# Patient Record
Sex: FEMALE | Race: WHITE
[De-identification: names, ages, dates, MRNs, and addresses within clinical notes are randomized per-mention and may not be internally consistent; named-entity substitution may affect disease eponyms.]

---

## 2017-04-14 ENCOUNTER — HOSPITAL ENCOUNTER (EMERGENCY)
Dept: HOSPITAL 68 - ERH | Age: 52
End: 2017-04-14
Payer: COMMERCIAL

## 2017-04-14 VITALS — SYSTOLIC BLOOD PRESSURE: 139 MMHG | DIASTOLIC BLOOD PRESSURE: 84 MMHG

## 2017-04-14 VITALS — BODY MASS INDEX: 25.9 KG/M2 | WEIGHT: 165 LBS | HEIGHT: 67 IN

## 2017-04-14 DIAGNOSIS — R10.13: Primary | ICD-10-CM

## 2017-04-14 LAB
ABSOLUTE GRANULOCYTE CT: 5.9 /CUMM (ref 1.4–6.5)
BASOPHILS # BLD: 0 /CUMM (ref 0–0.2)
BASOPHILS NFR BLD: 0.4 % (ref 0–2)
EOSINOPHIL # BLD: 0.2 /CUMM (ref 0–0.7)
EOSINOPHIL NFR BLD: 1.9 % (ref 0–5)
ERYTHROCYTE [DISTWIDTH] IN BLOOD BY AUTOMATED COUNT: 13.5 % (ref 11.5–14.5)
GRANULOCYTES NFR BLD: 63.6 % (ref 42.2–75.2)
HCT VFR BLD CALC: 39.1 % (ref 37–47)
LYMPHOCYTES # BLD: 2.6 /CUMM (ref 1.2–3.4)
MCH RBC QN AUTO: 30.4 PG (ref 27–31)
MCHC RBC AUTO-ENTMCNC: 33.5 G/DL (ref 33–37)
MCV RBC AUTO: 90.6 FL (ref 81–99)
MONOCYTES # BLD: 0.6 /CUMM (ref 0.1–0.6)
PLATELET # BLD: 273 /CUMM (ref 130–400)
PMV BLD AUTO: 8.5 FL (ref 7.4–10.4)
RED BLOOD CELL CT: 4.31 /CUMM (ref 4.2–5.4)
WBC # BLD AUTO: 9.3 /CUMM (ref 4.8–10.8)

## 2017-06-05 ENCOUNTER — HOSPITAL ENCOUNTER (EMERGENCY)
Dept: HOSPITAL 68 - ERH | Age: 52
End: 2017-06-05
Payer: COMMERCIAL

## 2017-06-05 VITALS — BODY MASS INDEX: 25.9 KG/M2 | HEIGHT: 67 IN | WEIGHT: 165 LBS

## 2017-06-05 VITALS — DIASTOLIC BLOOD PRESSURE: 84 MMHG | SYSTOLIC BLOOD PRESSURE: 124 MMHG

## 2017-06-05 DIAGNOSIS — M54.16: Primary | ICD-10-CM

## 2017-06-05 NOTE — ED UPPER/LOWER EXTREMITY COMPL
History of Present Illness
 
General
Chief Complaint: Lower Extremity Problems
Stated Complaint: L LEG PAIN
Source: patient
Exam Limitations: no limitations
 
Vital Signs & Intake/Output
Vital Signs & Intake/Output
 Vital Signs
 
 
Date Time Temp Pulse Resp B/P B/P Pulse O2 O2 Flow FiO2
 
     Mean Ox Delivery Rate 
 
 1343 97.2 96 16 124/84  98 Room Air  
 
 
Allergies
Coded Allergies:
Influenza Virus Vaccines (ITCHING 16)
morphine (REDNESS, ITCHING, SWELLING 16)
tetanus immune globulin (UNKNOWN 16)
tetanus toxoid, adsorbed (RASH 16)
prochlorperazine (From Compazine) (STROKE-LIKE SYMPTOMS 16)
 
Reconcile Medications
Cyclobenzaprine HCl 10 MG TABLET   1 TAB PO QPM MUSCLE SPASMS  (Reported)
Meclizine HCl 12.5 MG TABLET   1 TAB PO BID VERTIGO  (Reported)
Oxycodone HCl 15 MG TABLET   1 TAB PO Q6 PAIN  (Reported)
Pantoprazole Sodium 40 MG TABLET.DR   1 TAB PO DAILY GI  (Reported)
Simvastatin (Zocor*) 20 MG TABLET   1 TAB PO QPM CHOLESTEROL  (Reported)
 
Triage Note:
PT HERE WITH C/O OF LEFT LEG PAIN.  PT STATES SHE
 DOES HAVE SCIATICA PROBLEM.  PT STATES SHE CAN
 PINCH HER LEG AND SHE CAN'T FEEL IT.  PT WAS
 STATES SHE CAN'T "HARDLY" WALK ON HER LEG.  PT
 AMBULATED TO TRIAGE
 ROYCE
 SHE
Triage Nurses Notes Reviewed? yes
HPI:
Patient presents for evaluation of worsening left leg pain and numbness that 
began gradually yesterday.  The pain has been constant but getting worse.  The 
patient states that her left lower extremity feels "heavy", difficulty lifting 
it up off the stretcher.  The patient's pain is described as a sharp shooting 
pain that runs down the anterior aspect of the entire leg.  The other numbness 
or tingling occurs over the anterior left thigh.  She has a past medical history
of chronic back pain and sciatica and has been in pain management.
(NICOLE MORALES,AURELIA MATSON)
 
Past History
 
Travel History
Traveled to [place] past 21 day No
 
Medical History
Any Pertinent Medical History? see below for history
Neurological: NONE
EENT: allergies
Cardiovascular: hyperlipidemia
Respiratory: NONE
Gastrointestinal: GERD
Hepatic: NONE
Renal: NONE
Musculoskeletal: disk herniation, sciatica
Psychiatric: NONE
Endocrine: NONE
Blood Disorders: NONE
Cancer(s): NONE
GYN/Reproductive: NONE
History of MRSA: No
History of VRE: No
History of CDIFF: No
 
Surgical History
Surgical History: , hysterectomy, tubal ligation, "ACID REFLUX SX" LEFT
KNEE SURGERY
 
Psychosocial History
Who do you live with Spouse
Services at Home None
What is your primary language English
Tobacco Use: Never used
ETOH Use: denies use
Illicit Drug Use: denies illicit drug use
 
Family History
Family History, If Any:
FATHER
MOTHER
BROTHER
SISTER
Relation not specified for:
  FH: asthma
  FH: colon cancer
  FH: Crohn's disease
  FH: early death
  FH: pancreatic cancer
  Hypertrophic cardiomyopathy
 
Hx Contributory? No
(AURELIA KNOTT MD)
 
Review of Systems
 
Review of Systems
Constitutional:
Reports: no symptoms. 
EENTM:
Reports: no symptoms. 
Respiratory:
Reports: no symptoms. 
Cardiovascular:
Reports: no symptoms. 
Gastrointestinal/Abdominal:
Reports: no symptoms. 
Genitourinary:
Reports: no symptoms. 
Musculoskeletal:
Reports: see HPI. 
Skin:
Reports: no symptoms. 
Neurological/Psychological:
Reports: see HPI. 
Hematologic/Endocrine:
Reports: no symptoms. 
Immunological:
Reports: no symptoms. 
All Other Systems: Reviewed and Negative
(NICOLE MORALES,AURELIA MATSON)
 
Physical Exam
 
Physical Exam
General Appearance: SEE BELOW
Comments:
Gen.: Well-nourished, well-developed, no acute respiratory distress.
Head: Normocephalic, atraumatic.
Eyes: Normal inspection bilaterally
Ears: Normal inspection bilaterally
Nose: Normal inspection, nasal cannula in place
Throat/mouth : Moist mucosa 
Neck: Supple, full range of motion, no goiter
Heart: Regular rate and rhythm
Lungs: Quiet respirations
Back: Normal range of motion
Extremities: Left lower extremity: Sensation intact to light touch 
circumferentially of the left lower extremity with decrease in sensation over 
the anterior left thigh.  Deep tendon reflexes intact to the knee and Achilles. 
Diminished ability to lift leg up off the stretcher (question weakness).  No 
saddle paresthesias but patient has diminished sensation of the saddle region of
the left lower extremity.  Distal pulses are intact to the left lower extremity.
Neurologic: Cranial nerves grossly intact, speech is clear
Skin: warm and dry
Psychiatric: Calm, cooperative, no apparent delusions or hallucinations
 
Diagram
Legs Front/Back
 
  1) numbness with decreased but present sensation
(AURELIA KNOTT MD)
 
Progress
Differential Diagnosis: DEGENERATIVE DISC DISEASE, RADICULOPATHY, SCIATICA, 
CAUDA EQUINA SYNDROME
Plan of Care:
 Orders
 
 
Procedure Date/time Status
 
Heart Healthy Diet  B Active
 
 
Comments:
2017 3:38:31 PM patient signed out to Dr. Meraz at shift change over.
(NICOLE MORALES,AURELIA MATSON)
 
Departure
 
Departure
Disposition: STILL A PATIENT
Condition: Stable
Clinical Impression
Primary Impression: Lumbar radiculopathy
Referrals:
ELVIRA MORALES,GLENN AYALA (PCP/Family)
 
Departure Forms:
Customer Survey
General Discharge Information
(NICOLE MORALES,AURELIA MATSON)
 
Departure
Comments
 
 
 
17
6 PM
52-year-old female was signed out to me by Dr. Knott.  CT scan does not show any 
acute changes.  There are no acute spinal fractures.
 
 
RESULTS BELOW
 
IMPRESSION:
No acute osseous or articular abnormality involving the lumbar spine. No
acute fracture or subluxation of the lumbar spine. Multilevel degenerative
disc disease and facet arthrosis of the lumbar spine, as noted above with
mild to moderate bilateral neuroforaminal narrowing at L4-L5. No significant
spinal canal stenosis. Please note that MRI would be more sensitive in
evaluating for underlying neuroforaminal narrowing and spinal canal stenosis.
This can be performed on an outpatient basis barring any indications for
emergent imaging of the lumbar spine.
 
DICTATED BY: MARIA LUZ WANG MD 
DATE/TIME DICTATED:17
:JOSIAH 
DATE/TIME TRANSCRIBED:17
 
CONFIDENTIAL, DO NOT COPY WITHOUT APPROPRIATE AUTHORIZATION.
 
 <Electronically signed in Other Vendor System>                                 
          
                                           SIGNED BY: MARIA LUZ WANG MD [id number]
[id number]
 
(AURELIA MERAZ DO

## 2017-06-05 NOTE — CT SCAN REPORT
EXAMINATION:
CT LUMBAR SPINE WITHOUT CONTRAST
 
CLINICAL INFORMATION:
Back pain. Numbness.
 
COMPARISON:
MRI lumbar spine 03/28/2016.
 
TECHNIQUE:
Helical non-contrast CT images were obtained through the lumbar spine and
1.25 and 2.5 mm axial reconstructions were reviewed along with sagittal and
coronal MPRs.
 
DLP:
529 mGy-cm
 
FINDINGS:
There are 5 nonrib-bearing lumbar vertebral bodies. No acute fracture or
subluxation of the lumbar spine is identified. There is multilevel
degenerative spondylosis of the lumbar spine, most significant at L5-S1.
Focal lucencies within the L4 and L5 vertebral bodies corresponds to
previously identified intramural osseous hemangiomas. Evaluation of the
included soft tissues demonstrates scattered atherosclerosis of the thoracic
aorta. A 2 mm nonobstructing stone is identified within the midpole of the
left kidney.
 
Evaluation of the individual levels demonstrates:
 
T12-L1: No significant neuroforaminal narrowing or spinal canal stenosis.
 
L1-L2: No significant neuroforaminal narrowing or spinal canal stenosis.
 
L2-L3: Slight diffuse annular disc bulge. Mild bilateral facet arthrosis
without significant spinal canal stenosis or neuroforaminal narrowing.
 
L3-L4: Annular disc bulge and facet arthrosis contributing to mild
right-sided neuroforaminal narrowing. No significant left-sided
neuroforaminal narrowing or spinal canal stenosis.
 
L4-L5: Bilateral facet arthrosis and annular disc bulge contributing to mild
to moderate bilateral neuroforaminal narrowing. No significant spinal canal
stenosis.
 
L5-S1: No significant neuroforaminal narrowing or spinal canal stenosis.
 
IMPRESSION:
No acute osseous or articular abnormality involving the lumbar spine. No
acute fracture or subluxation of the lumbar spine. Multilevel degenerative
disc disease and facet arthrosis of the lumbar spine, as noted above with
mild to moderate bilateral neuroforaminal narrowing at L4-L5. No significant
spinal canal stenosis. Please note that MRI would be more sensitive in
evaluating for underlying neuroforaminal narrowing and spinal canal stenosis.
This can be performed on an outpatient basis barring any indications for
emergent imaging of the lumbar spine.

## 2017-06-28 ENCOUNTER — HOSPITAL ENCOUNTER (OUTPATIENT)
Dept: HOSPITAL 68 - ERH | Age: 52
Setting detail: OBSERVATION
LOS: 1 days | End: 2017-06-29
Attending: INTERNAL MEDICINE | Admitting: INTERNAL MEDICINE
Payer: COMMERCIAL

## 2017-06-28 VITALS — SYSTOLIC BLOOD PRESSURE: 128 MMHG | DIASTOLIC BLOOD PRESSURE: 84 MMHG

## 2017-06-28 VITALS — HEIGHT: 67 IN | WEIGHT: 165 LBS | BODY MASS INDEX: 25.9 KG/M2

## 2017-06-28 VITALS — SYSTOLIC BLOOD PRESSURE: 110 MMHG | DIASTOLIC BLOOD PRESSURE: 70 MMHG

## 2017-06-28 DIAGNOSIS — K21.9: ICD-10-CM

## 2017-06-28 DIAGNOSIS — R07.9: ICD-10-CM

## 2017-06-28 DIAGNOSIS — M54.89: ICD-10-CM

## 2017-06-28 DIAGNOSIS — K29.00: Primary | ICD-10-CM

## 2017-06-28 DIAGNOSIS — R10.9: ICD-10-CM

## 2017-06-28 LAB
ABSOLUTE GRANULOCYTE CT: 4 /CUMM (ref 1.4–6.5)
BASOPHILS # BLD: 0.1 /CUMM (ref 0–0.2)
BASOPHILS NFR BLD: 0.9 % (ref 0–2)
EOSINOPHIL # BLD: 0.1 /CUMM (ref 0–0.7)
EOSINOPHIL NFR BLD: 1.7 % (ref 0–5)
ERYTHROCYTE [DISTWIDTH] IN BLOOD BY AUTOMATED COUNT: 13.1 % (ref 11.5–14.5)
GRANULOCYTES NFR BLD: 60.4 % (ref 42.2–75.2)
HCT VFR BLD CALC: 36.7 % (ref 37–47)
LYMPHOCYTES # BLD: 2 /CUMM (ref 1.2–3.4)
MCH RBC QN AUTO: 31.2 PG (ref 27–31)
MCHC RBC AUTO-ENTMCNC: 34.1 G/DL (ref 33–37)
MCV RBC AUTO: 91.4 FL (ref 81–99)
MONOCYTES # BLD: 0.4 /CUMM (ref 0.1–0.6)
PLATELET # BLD: 237 /CUMM (ref 130–400)
PMV BLD AUTO: 8.6 FL (ref 7.4–10.4)
RED BLOOD CELL CT: 4.02 /CUMM (ref 4.2–5.4)
WBC # BLD AUTO: 6.7 /CUMM (ref 4.8–10.8)

## 2017-06-28 PROCEDURE — G0378 HOSPITAL OBSERVATION PER HR: HCPCS

## 2017-06-28 NOTE — ED GI/GU/ABDOMINAL COMPLAINT
History of Present Illness
 
General
Chief Complaint: Chest Pain
Stated Complaint: PT C/O CHEST PAIN DIFF BREATHING O2 SAT 99%
Source: patient
Exam Limitations: no limitations
Allergies
Coded Allergies:
Influenza Virus Vaccines (ITCHING 17)
morphine (REDNESS, ITCHING, SWELLING 17)
tetanus immune globulin (UNKNOWN 17)
tetanus toxoid, adsorbed (RASH 17)
prochlorperazine (From Compazine) (STROKE-LIKE SYMPTOMS 17)
 
Reconcile Medications
Cyclobenzaprine HCl 10 MG TABLET   1 TAB PO QPM MUSCLE SPASMS  (Reported)
Diclofenac Sodium 75 MG TABLET.DR   1 TAB PO BID PRN PAIN
Hyoscyamine (Levsin) 0.125 MG TABLET   1-2 TAB PO Q6P PRN ABDOMINAL CRAMPS
Meclizine HCl 12.5 MG TABLET   1 TAB PO BID VERTIGO  (Reported)
Ondansetron (Zofran Odt) 4 MG TAB.RAPDIS   1 TAB SL Q6P PRN NAUSEA/VOMITING
Oxycodone HCl 15 MG TABLET   1 TAB PO Q6 PAIN  (Reported)
Pantoprazole Sodium 40 MG TABLET.DR   1 TAB PO DAILY GI  (Reported)
Simvastatin (Zocor*) 20 MG TABLET   1 TAB PO QPM CHOLESTEROL  (Reported)
 
Triage Note:
TRIAGE: PATIENT TO ER REPORTS WOKE W/ 10/10
 BURNING SHARP PAIN TO MID CHEST AND INTO/ ACROSS
 UPPER BACK W/ SOB. REPORTS RELIEF WHEN SITTING
 "HUNCHED OVER." PATIENT REPORTS "MY STOMACH HURTS
 AND I FEEL NAUSEOUS." HX GERD.
Triage Nurses Notes Reviewed? yes
Pregnant? n
Is pt currently breastfeeding? No
HPI:
Patient presents for evaluation of an abrupt onset of sharp epigastric abdominal
pain and a burning back pain that began about 12:00 this morning.  The pains of 
been severe and constant.  Patient has also felt "gassy" but denies any 
associated fever, cold symptoms, vomiting, diarrhea or dysuria.  She likewise 
denies any prior episodes.  Nothing seems to make the pain feel better.
(NICOLE MORALES,HAY MATSON)
 
Vital Signs & Intake/Output
Vital Signs & Intake/Output
 Vital Signs
 
 
Date Time Temp Pulse Resp B/P B/P Pulse O2 O2 Flow FiO2
 
     Mean Ox Delivery Rate 
 
 1450 97.6 82 20 122/80  94 Room Air  
 
 0717 97.7 83 20 108/70  97 Room Air  
 
 2303 98.3 98 20 128/84  94 Room Air  
 
 
 ED Intake and Output
 
 
  0000  1200
 
Intake Total 260 1000
 
Output Total  
 
Balance 260 1000
 
   
 
Intake, IV 10 1000
 
Intake, Oral 250 
 
Patient 165 lb 165 lb
 
Weight  
 
Weight  Reported by Patient
 
Measurement  
 
Method  
 
 
 
Past History
 
Travel History
Traveled to [place] past 21 day No
 
Medical History
Any Pertinent Medical History? see below for history
Neurological: NONE
EENT: allergies
Cardiovascular: hyperlipidemia
Respiratory: NONE
Gastrointestinal: GERD
Hepatic: NONE
Renal: NONE
Musculoskeletal: disk herniation, sciatica
Psychiatric: NONE
Endocrine: NONE
Blood Disorders: NONE
Cancer(s): NONE
GYN/Reproductive: NONE
History of MRSA: No
History of VRE: No
History of CDIFF: No
 
Surgical History
Surgical History: , hysterectomy, tubal ligation, "ACID REFLUX SX" LEFT
KNEE SURGERY
 
Psychosocial History
Who do you live with Spouse
Services at Home None
What is your primary language English
Tobacco Use: Never used
 
Family History
Family History, If Any:
FATHER
MOTHER
BROTHER
SISTER
Relation not specified for:
  FH: asthma
  FH: colon cancer
  FH: Crohn's disease
  FH: early death
  FH: pancreatic cancer
  Hypertrophic cardiomyopathy
 
Hx Contributory? No
(NICOLE MORALES,HAY MATSON)
 
Review of Systems
 
Review of Systems
Constitutional:
Reports: no symptoms. 
EENTM:
Reports: no symptoms. 
Respiratory:
Reports: no symptoms. 
Cardiovascular:
Reports: no symptoms. 
GI:
Reports: see HPI. 
Genitourinary:
Reports: no symptoms. 
Musculoskeletal:
Reports: no symptoms. 
Skin:
Reports: no symptoms. 
Neurological/Psychological:
Reports: no symptoms. 
Hematologic/Endocrine:
Reports: no symptoms. 
Immunologic/Allergic:
Reports: no symptoms. 
All Other Systems: Reviewed and Negative
(NICOLE MORALES,HAY MATSON)
 
Physical Exam
 
Physical Exam
Gastrointestinal: SEE BELOW
Comments:
Gen.: Well-nourished, well-developed, no acute respiratory distress.
Head: Normocephalic, atraumatic.
Eyes: Normal inspection bilaterally
Ears: Normal inspection bilaterally
Nose: Normal inspection
Throat/mouth : Moist mucosa 
Neck: Supple, full range of motion, no goiter
Heart: Regular rate and rhythm, no murmurs rubs or gallops
Lungs: Clear to auscultation bilaterally with normal air entry
Chest: Nontender
Back: Normal range of motion
Abdomen: Soft, left mid quadrant and epigastric abdominal pain without rebound 
or guarding, no pulsatile masses, nondistended, normal bowel sounds
Extremities: Normal range of motion grossly, equal radial pulses, no cyanosis 
clubbing or edema, equal extremity pulses.
Neurologic: Cranial nerves grossly intact, speech is clear
Skin: warm and dry
Psychiatric: Calm, cooperative, no apparent delusions or hallucinations
 
 
Core Measures
ACS in differential dx? No
Severe Sepsis Present: No
Septic Shock Present: No
(NICOLE MORALES,HAY MATSON)
 
Progress
Differential Diagnosis: PANCREATITIS, PEPTIC ULCER DISEASE, ACID REFLUX, AORTIC 
DISEASE
Diagnostic Imaging:
Discussed w/RAD: CT Scan. 
Radiology Impression: PATIENT: DOMONIQUE SCOTT  MEDICAL RECORD NO: [id number] 
PRESENT AGE: 52  PATIENT ACCOUNT NO: [id number] : 66  LOCATION: [facility] 
ORDERING PHYSICIAN: HAY KNOTT MD     SERVICE DATE:  EXAM TYPE:
CAT - CT ABD ANGIOGRAM; CT PELVIS ANGIOGRAM; CTA CHEST EXAMINATION: CT ANGIOGRAM
THE CHEST ABDOMEN AND PELVIS. CLINICAL INFORMATION: Evaluate for aortic 
dissection. Chest abdominal and back pain. COMPARISON: CT scan of the abdomen 
and pelvis 2016. TECHNIQUE:  images were obtained. A noncontrast CT 
scan of the chest was obtained. A CT angiogram of the chest abdomen and pelvis 
was then performed after the intravenous administration of 115 mL Optiray 350. 
No adverse contrast reaction was reported. Data was reformatted into multiplanar
images at the acquisition workstation. DLP: 1261.54 mGy-cm. FINDINGS: The 
noncontrast images through the chest reveal no evidence of mural hemorrhage to 
suggest acute arterial dissection. The diameter of the thoracic aorta is normal 
and there is no evidence of acute vascular dissection. The origins of major 
aortic branches are widely patent. The heart is normal and there is no 
pericardial effusion. There is adequate opacification of the pulmonary arterial 
vasculature and there is no evidence to suggest acute pulmonary embolism. Lungs 
are clear and well expanded. There is no focal consolidative disease, pleural 
effusion, or pneumothorax. The trachea and major airways are widely patent. 
Visualized portions of the thoracic outlet are normal. The chest wall is intact 
and there is no evidence of acute rib fracture. There is no acute osseous 
finding. No worrisome lytic or blastic osseous lesions within the field-of-view 
of this examination. Vertebral heights are preserved and there is no evidence of
acute thoracic vertebral compression fracture. Grossly no evidence of bony canal
or neuroforaminal compromise within the thoracic spine. A few scattered foci of 
atheromatous calcification is visualized within the infrarenal abdominal aorta. 
The renal arteries are widely patent and the major unpaired aortic branches 
including the celiac trunk, superior mesenteric artery, and inferior mesenteric 
artery are patent. The iliac arteries as well as the internal and external iliac
artery branches are patent. Visualized common femoral arteries as well as their 
superficial and deep femoral artery branches are patent. Liver attenuation is 
homogeneous with no evidence of a discrete hepatic parenchymal mass. Gallbladder
is normal. Grossly there is no evidence of extrahepatic biliary ductal 
dilatation. Spleen and pancreas are normal. Kidneys demonstrate symmetric 
enhancement with no evidence of a discrete mass. There is no hydronephrosis. No 
abnormal perinephric inflammation or collection. Adrenal glands are normal. The 
stomach is physiologically distended with ingested material. Small bowel is 
normal. A few diverticula are visualized within the distal descending and 
sigmoid colon. No evidence of acute diverticulitis. Appendix is normal. No 
abnormal perirectal or presacral inflammation. There are chronic changes of an 
abdominal hysterectomy. There is no worrisome adnexal mass. No worrisome lytic 
or blastic osseous lesions. There is intervertebral disc space narrowing with 
associated sclerotic degenerative endplate changes and disc osteophyte spurring 
at L5-S1. Grossly no evidence of bony canal compromise. No evidence of acute 
fracture. IMPRESSION: No evidence of acute vascular dissection. There is no 
abnormal finding to provide an explanation for the patient's chest, abdominal, 
and back pain. DICTATED BY: TYSON NEAL MD  DATE/TIME DICTATED:17 :RAD.TRUJILLO  DATE/TIME TRANSCRIBED:17 
CONFIDENTIAL, DO NOT COPY WITHOUT APPROPRIATE AUTHORIZATION.  <Electronically 
signed in Other Vendor System>                                                  
                                     SIGNED BY: TYSON NEAL MD 17 
0531
Initial ED EKG: NSR, rate (97)
Prior EKG: unchanged
Comments:
2017 7:01:02 AM I have updated Domonique for the second time on her test 
results, initially after the blood work was resulted and shortly ago with the 
CAT scan reports.  I ordered a second round of medications that is yet to be 
administered.  Patient signed out to Dr. Edgar.
(NICOLE MORALES,HAY MATSON)
Comments:
Continues to have discomfort.  The initial midsternal pain has migrated to the 
epigastric area with gas and increase stooling.
 
Unable to remain comfortable.  Unable to eat.
(HERVE MORALES,MICA)
Plan of Care:
 Orders
 
 
Procedure Date/time Status
 
Regular Diet  L Active
 
Full Liquid Diet  B Complete
 
Discharge Patient   UNK Active
 
SOCIAL WORK CONSULT   UNK Active
 
Vital Signs 1925 Complete
 
Teach/Educate 1925 Active
 
Pain Treatment and Response 1925 Active
 
Nutritional Intake, Monitor 1925 Active
 
Isolation 1925 Active
 
Intake & Output 1925 Active
 
Patient Care Conference 1925 Active
 
Activity/Ambulation 1925 Active
 
 
 Laboratory Tests
 
 
 
17 0648:
Anion Gap 9, Estimated GFR > 60, BUN/Creatinine Ratio 10.0, CBC w Diff NO MAN 
DIFF REQ, RBC 3.61  L, MCV 92.1, MCH 31.5  H, RDW 13.1, MPV 8.9, Gran % 54.5, 
Lymphocytes % 37.7, Monocytes % 5.8, Eosinophils % 1.6, Basophils % 0.4, 
Absolute Granulocytes 2.9, Absolute Lymphocytes 2.0, Absolute Monocytes 0.3, 
Absolute Eosinophils 0.1, Absolute Basophils 0, PUBS MCHC 34.2
 
17 2203:
Troponin I < 0.01
 Microbiology
622  STOOL: Stool Culture - RECD
 
I WAS NOT INVOLVED IN THE PATIENT'S CARE.
(SUNDAY MORALES,PEDRITO)
 
Departure
 
Departure
Condition: Stable
Clinical Impression
Primary Impression: Nonspecific abdominal pain
Referrals:
ELVIRA MORALES,GLENN AYALA (PCP/Family)
 
Additional Instructions:
Levsin and diclofenac as needed for abdominal cramps.  Zofran as needed for 
nausea or vomiting.  Clear liquid diet and advance as tolerated.  Follow-up with
your primary care physician if not improving over the next 48-72 hours.  Return 
if any concerns or sudden worsening.
 
Please note that there might be incidental findings in your evaluation that are 
unrelated to the current emergency department visit.  Please notify your primary
care doctor about this emergency department visit in order to obtain and review 
all of the testing performed so that these incidental findings can be monitored 
as needed.
 
If you had an x-ray performed, please understand that some fractures may not be 
seen on the initial set of x-rays.  If your symptoms persist you might need a 
repeat set of x-rays to check for such a fracture.
 
If you had a laceration evaluated, please understand that foreign bodies such as
glass or wood may not be visible to the naked eye or on plain x-rays.  If the 
wound becomes red, swollen, increasingly more painful or if there is any 
drainage from the wound, please have it reevaluated by a physician for the 
possibility of a retained foreign body.
 
Thank you for choosing the [facility] Emergency Department for your care.
It was a pleasure to serve you today.
 
Hay Knott M.D.
[facility]
 
Departure Forms:
Customer Survey
General Discharge Information
Prescriptions:
Current Visit Scripts
Hyoscyamine (Levsin) 1-2 TAB PO Q6P PRN ABDOMINAL CRAMPS 
     #20 TAB 
 
Diclofenac Sodium 1 TAB PO BID PRN PAIN 
     #14 TAB 
 
Ondansetron (Zofran Odt) 1 TAB SL Q6P PRN NAUSEA/VOMITING 
     #10 TAB 
 
 
(NICOLE MORALES,HAY MATSON)
 
Departure
Disposition: STILL A PATIENT
 
Observation Note
Spoke With:
CHLOE MORALES,[facility]
Physician Advisor Notified: MARCI MORALES,JUAN MARSHALL
Place Patient In: Non-ED OBS Care Area
Rationale for Observation:
My rational for observation is as follows intractable abdominal and chest pain.
 
(HERVE MORALES,MICA)
 
Observation Note
Rationale for Observation:
My rational for observation is as follows .
 
(SUNDAY MORALES,PEDRITO)
 
 
Hyoscyamine 0.125 MG Q4 HRS AS NEEDED PRN  1645 AC 
 
(Levsin)     
 
Oxycodone HCl 15 MG Q6P PRN  1645 AC 
 
(Roxicodone)     0856
 
Acetaminophen 650 MG Q6P PRN  1600 AC 
 
(Tylenol)     0109
 
Hydromorphone HCl 0.2 MG Q4P PRN  1600 AC 
 
(Dilaudid)     
 
Ondansetron HCl 4 MG Q6P PRN  1600 AC 
 
(Zofran)     0025
 
Sodium Chloride 1,000 ML Q13H  1600 AC 
 
(Normal Saline 0.9%)    1759  0343
 
 
 Laboratory Tests
 
 
 
17 0648:
Anion Gap 9, Estimated GFR > 60, BUN/Creatinine Ratio 10.0, CBC w Diff NO MAN 
DIFF REQ, RBC 3.61  L, MCV 92.1, MCH 31.5  H, RDW 13.1, MPV 8.9, Gran % 54.5, 
Lymphocytes % 37.7, Monocytes % 5.8, Eosinophils % 1.6, Basophils % 0.4, 
Absolute Granulocytes 2.9, Absolute Lymphocytes 2.0, Absolute Monocytes 0.3, 
Absolute Eosinophils 0.1, Absolute Basophils 0, PUBS MCHC 34.2
 
17 2203:
Troponin I < 0.01
 
17 1705:
Troponin I < 0.01
 
17 1705:
Lactic Acid 1.3
 Microbiology
 0622  STOOL: Stool Culture - RECD
 
I WAS NOT INVOLVED IN THE PATIENT'S CARE.
(SUNDAY MORALES,PEDRITO)
Comments:
Continues to have discomfort.  The initial midsternal pain has migrated to the 
epigastric area with gas and increase stooling.
 
Unable to remain comfortable.  Unable to eat.
(HERVE MORALES,MICA)
 
Departure
 
Departure
Condition: Stable
Clinical Impression
Primary Impression: Nonspecific abdominal pain
Referrals:
ELVIRA MORALES,GLENN AYALA (PCP/Family)
 
Additional Instructions:
Levsin and diclofenac as needed for abdominal cramps.  Zofran as needed for 
nausea or vomiting.  Clear liquid diet and advance as tolerated.  Follow-up with
your primary care physician if not improving over the next 48-72 hours.  Return 
if any concerns or sudden worsening.
 
Please note that there might be incidental findings in your evaluation that are 
unrelated to the current emergency department visit.  Please notify your primary
care doctor about this emergency department visit in order to obtain and review 
all of the testing performed so that these incidental findings can be monitored 
as needed.
 
If you had an x-ray performed, please understand that some fractures may not be 
seen on the initial set of x-rays.  If your symptoms persist you might need a 
repeat set of x-rays to check for such a fracture.
 
If you had a laceration evaluated, please understand that foreign bodies such as
glass or wood may not be visible to the naked eye or on plain x-rays.  If the 
wound becomes red, swollen, increasingly more painful or if there is any 
drainage from the wound, please have it reevaluated by a physician for the 
possibility of a retained foreign body.
 
Thank you for choosing the [facility] Emergency Department for your care.
It was a pleasure to serve you today.
 
Hay Knott M.D.
[facility]
 
Departure Forms:
Customer Survey
General Discharge Information
Prescriptions:
Current Visit Scripts
Hyoscyamine (Levsin) 1-2 TAB PO Q6P PRN ABDOMINAL CRAMPS 
     #20 TAB 
 
Diclofenac Sodium 1 TAB PO BID PRN PAIN 
     #14 TAB 
 
Ondansetron (Zofran Odt) 1 TAB SL Q6P PRN NAUSEA/VOMITING 
     #10 TAB 
 
 
(NICOLE MORALES,HAY MATSON)
 
Departure
Disposition: STILL A PATIENT
 
Observation Note
Spoke With:
CHLOE MORALES,[facility]
Physician Advisor Notified: MARCI MORALES,JUAN MARSHALL
Place Patient In: Non-ED OBS Care Area
Rationale for Observation:
My rational for observation is as follows intractable abdominal and chest pain.
 
(MICA EDGAR MD)
 
Observation Note
Rationale for Observation:
My rational for observation is as follows .
 
(PEDRITO BRAND MD)
 
Rationale for Observation:
My rational for observation is as follows intractable abdominal and chest pain.
 
(MICA EDGAR MD)
 
Observation Note
Rationale for Observation:
My rational for observation is as follows .
 
(PEDRITO BRAND MD)

## 2017-06-28 NOTE — PN- ATT ADDEND
Attending Addendum
Attending Brief Note
Patient seen and examined in the emergency room.  Case discussed with ED 
provider Dr. Sommers.  Plan of care discussed with the medical team and the 
patient.  Available lab work and radiology test reports were reviewed.  
 
This is 52-year-old female with past history of GERD symptoms status post Nissen
fundoplication by laparoscopic surgery about 2 years ago by Dr. Baumann.  She 
routinely takes Prilosec twice a day.  Her upper endoscopy was in November 2016 
which did not show any ulcers or esophagitis.  Her colonoscopy in March 2016 
only showed mild diverticulosis. 
 
Patient presents emergency him today for 1 day history of nausea vomiting and 
diarrhea.  The epigastric and lower retrosternal chest pain.  Pain started in 
the lower retrosternal area and then gravitated towards epigastric area.  
Patient has not been able to eat or drink.  She ate chicken with her family last
night.  Rest of family members are fine.  Patient denies any fever or chills.  
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 
gastroenteritis.  
 
 Vital Signs
 
 
Date Time Temp Pulse Resp B/P B/P Pulse O2 O2 Flow FiO2
 
     Mean Ox Delivery Rate 
 
06/28 1444 98.2 78 18 120/63  99 Room Air  
 
06/28 1141 98.4 74 20 109/64  99 Room Air  
 
06/28 0903 98.4 105 18 138/83  99 Room Air  
 
06/28 0651 96.7 94 18 131/79  99 Room Air  
 
06/28 0449 96.4        
 
06/28 0449 96.4 95 18 141/83  99 Room Air  
 
06/28 0251      97 Room Air  
 
06/28 0222 96.3 104 18 144/84  99 Room Air  
 
 
 Intake & Output
 
 
 06/28 1600 06/28 0800 06/28 0000
 
Intake Total  1000 
 
Output Total   
 
Balance  1000 
 
    
 
Intake, IV  1000 
 
Patient  165 lb 
 
Weight   
 
Weight  Reported by Patient 
 
Measurement   
 
Method   
 
 
Exam: 
General: Patient awake alert oriented without any distress 
CVS: S1 plus S2 without any murmur or gallops 
Chest: Few scattered crepitation without any wheeze.  There is no respiratory 
distress. 
Abdomen: Soft and nondistended with the mild discomfort in epigastric area.   
bowel sound present, no guarding or rebound 
CNS: Awake alert oriented without any focal neuro deficit and follows command  
appropriately 
Extremities: No edema; no clubbing or cyanosis noted.  
 
 Laboratory Tests
 
 
 06/28 06/28
 
 0646 0305
 
Chemistry  
 
  Sodium (137 - 145 mmol/L)  140
 
  Potassium (3.5 - 5.1 mmol/L)  3.8
 
  Chloride (98 - 107 mmol/L)  106
 
  Carbon Dioxide (22 - 30 mmol/L)  24
 
  Anion Gap (5 - 16)  11
 
  BUN (7 - 17 mg/dL)  7
 
  Creatinine (0.5 - 1.0 mg/dL)  0.7
 
  Estimated GFR (>60 ml/min)  > 60
 
  BUN/Creatinine Ratio (7 - 25 %)  10.0
 
  Glucose (65 - 99 mg/dL)  118  H
 
  Calcium (8.4 - 10.2 mg/dL)  9.3
 
  Total Bilirubin (0.2 - 1.3 mg/dL)  0.7
 
  AST (14 - 36 U/L)  33
 
  ALT (9 - 52 U/L)  48
 
  Alkaline Phosphatase (<127 U/L)  76
 
  Troponin I (< 0.11 ng/ml)  < 0.01
 
  Total Protein (6.3 - 8.2 g/dL)  6.7
 
  Albumin (3.5 - 5.0 g/dL)  4.0
 
  Globulin (1.9 - 4.2 gm/dL)  2.7
 
  Albumin/Globulin Ratio (1.1 - 2.2 %)  1.5
 
  Lipase (23 - 300 U/L)  147
 
Hematology  
 
  CBC w Diff  NO MAN DIFF REQ
 
  WBC (4.8 - 10.8 /CUMM)  6.7
 
  RBC (4.20 - 5.40 /CUMM)  4.02  L
 
  Hgb (12.0 - 16.0 G/DL)  12.5
 
  Hct (37 - 47 %)  36.7  L
 
  MCV (81.0 - 99.0 FL)  91.4
 
  MCH (27.0 - 31.0 PG)  31.2  H
 
  RDW (11.5 - 14.5 %)  13.1
 
  Plt Count (130 - 400 /CUMM)  237
 
  MPV (7.4 - 10.4 FL)  8.6
 
  Gran % (42.2 - 75.2 %)  60.4
 
  Lymphocytes % (20.5 - 51.1 %)  30.4
 
  Monocytes % (1.7 - 9.3 %)  6.6
 
  Eosinophils % (0 - 5 %)  1.7
 
  Basophils % (0.0 - 2.0 %)  0.9
 
  Absolute Granulocytes (1.4 - 6.5 /CUMM)  4.0
 
  Absolute Lymphocytes (1.2 - 3.4 /CUMM)  2.0
 
  Absolute Monocytes (0.10 - 0.60 /CUMM)  0.4
 
  Absolute Eosinophils (0.0 - 0.7 /CUMM)  0.1
 
  Absolute Basophils (0.0 - 0.2 /CUMM)  0.1
 
  PUBS MCHC (33.0 - 37.0 G/DL)  34.1
 
Urines  
 
  Urine Color (YEL,AMB,STR) YEL 
 
  Urine Clarity (CLEAR) CLEAR 
 
  Urine pH (5.0 - 8.0) 6.0 
 
  Ur Specific Gravity (1.001 - 1.035) <= 1.005 
 
  Urine Protein (NEG,<30 MG/DL) NEG 
 
  Urine Ketones (NEG) NEG 
 
  Urine Nitrite (NEG) NEG 
 
  Urine Bilirubin (NEG) NEG 
 
  Urine Urobilinogen (0.1  -  1.0 EU/dl) 0.2 
 
  Ur Leukocyte Esterase (NEG) NEG 
 
  Ur Microscopic SEDIMENT EXAMINED 
 
  Urine RBC (0 - 5 /HPF) 1-3 
 
  Urine WBC (0 - 2 /HPF) 1-3  H 
 
  Ur Epithelial Cells (NONE,FEW) FEW 
 
  Urine Bacteria (NEG/NONE) FEW  H 
 
  Urine Hemoglobin (NEG) TRACE-LYSED 
 
  Urine Glucose (N MG/DL) NEG 
 
 
CTA abdomen/ chest/pelvic CT 
No evidence of acute vascular dissection. There is no abnormal finding to
provide an explanation for the patient's chest, abdominal, and back pain.
 
EKG is in normal sinus rhythm without any acute ischemic changes.
 
Assessment
* Acute gastritis probably viral
* History of chronic back pain, status post epidural injections the past
* History of GERD
* History of Nissen fundoplication
* Chest pain rule out MI- patient's father had heart disease and mother had 
cardiomyopathy
Plan
* Observe on telemetry for 24 hours
* Start IV fluids if patient unable to take oral liquids
* Start IV Protonix 40 mg twice a day
* Switch Percocet to Dilaudid when necessary
* Monitor input output
* Recheck chemistry labs tomorrow
* Troponin 3 sets; EKG

## 2017-06-28 NOTE — NUR
PATIENT AMBULATORY TO AND FROM BATHROOM W/ STABLE GAIT NOTED, DENIES DIZZINESS.
URINE SPECIMEN (TRIO) OBTAINED AND SENT TO LAB. MD RIVERA AT BEDSIDE FOR
DISCUSSION OF CT RESULTS/ POC. NS BOLUS COMPLETE AT THIS TIME.

## 2017-06-28 NOTE — NUR
PATIENT NOTED TO LOWER SELF TO FLOOR WITNESSED BY THIS RN AND JADIEL SINGLETON. PATIENT
STATES "I FEEL DIZZY AND MY PAIN IS STILL 10/10." NO FALL OCCURED AND NO INJURY
OCCURED. PATIENT ASSISTED ONTO STRETCHER BY JADIEL SINGLETON. PATIENT REPLACED ON
CARDIAC MONITOR, HR WNL, NSR. VSS. SIDE RAILS REMAIN UPRIGHT, CALL RIVERA IN
REACH. MD RIVERA AWARE AND AT BEDSIDE.

## 2017-06-28 NOTE — NUR
PT AWAKE, ALERT, ORIENTED. ON MONITOR IN SINUS TACH. CONTINUES TO HAVE GI
DISTRESS STATES THE ONLY MEDICATION THAT MADE HER FEEL BETTER IN THE ED WAS THE
DILAUDID. IVF INFUSING AT 150CC/HR

## 2017-06-28 NOTE — CT SCAN REPORT
EXAMINATION:
CT ANGIOGRAM THE CHEST ABDOMEN AND PELVIS.
 
CLINICAL INFORMATION:
Evaluate for aortic dissection. Chest abdominal and back pain.
 
COMPARISON:
CT scan of the abdomen and pelvis 08/23/2016.
 
TECHNIQUE:
 images were obtained. A noncontrast CT scan of the chest was obtained.
A CT angiogram of the chest abdomen and pelvis was then performed after the
intravenous administration of 115 mL Optiray 350. No adverse contrast
reaction was reported. Data was reformatted into multiplanar images at the
acquisition workstation.
 
DLP: 1261.54 mGy-cm.
 
FINDINGS:
The noncontrast images through the chest reveal no evidence of mural
hemorrhage to suggest acute arterial dissection. The diameter of the thoracic
aorta is normal and there is no evidence of acute vascular dissection. The
origins of major aortic branches are widely patent. The heart is normal and
there is no pericardial effusion. There is adequate opacification of the
pulmonary arterial vasculature and there is no evidence to suggest acute
pulmonary embolism.
 
Lungs are clear and well expanded. There is no focal consolidative disease,
pleural effusion, or pneumothorax. The trachea and major airways are widely
patent. Visualized portions of the thoracic outlet are normal. The chest wall
is intact and there is no evidence of acute rib fracture. There is no acute
osseous finding. No worrisome lytic or blastic osseous lesions within the
field-of-view of this examination. Vertebral heights are preserved and there
is no evidence of acute thoracic vertebral compression fracture. Grossly no
evidence of bony canal or neuroforaminal compromise within the thoracic
spine.
 
A few scattered foci of atheromatous calcification is visualized within the
infrarenal abdominal aorta. The renal arteries are widely patent and the
major unpaired aortic branches including the celiac trunk, superior
mesenteric artery, and inferior mesenteric artery are patent. The iliac
arteries as well as the internal and external iliac artery branches are
patent. Visualized common femoral arteries as well as their superficial and
deep femoral artery branches are patent.
 
Liver attenuation is homogeneous with no evidence of a discrete hepatic
parenchymal mass. Gallbladder is normal. Grossly there is no evidence of
extrahepatic biliary ductal dilatation. Spleen and pancreas are normal.
 
Kidneys demonstrate symmetric enhancement with no evidence of a discrete
mass. There is no hydronephrosis. No abnormal perinephric inflammation or
collection. Adrenal glands are normal.
 
The stomach is physiologically distended with ingested material. Small bowel
is normal. A few diverticula are visualized within the distal descending and
sigmoid colon. No evidence of acute diverticulitis. Appendix is normal. No
abnormal perirectal or presacral inflammation.
 
There are chronic changes of an abdominal hysterectomy. There is no worrisome
adnexal mass.
 
No worrisome lytic or blastic osseous lesions. There is intervertebral disc
space narrowing with associated sclerotic degenerative endplate changes and
disc osteophyte spurring at L5-S1. Grossly no evidence of bony canal
compromise. No evidence of acute fracture.
 
IMPRESSION:
No evidence of acute vascular dissection. There is no abnormal finding to
provide an explanation for the patient's chest, abdominal, and back pain.

## 2017-06-28 NOTE — NUR
BLOODS DRAWN RAC AND SENT TO LAB
IV ACCESS ESTABLISHED LAC 20G FOR ANGIOGRAMS.  PT MEDICATED WITH 4 MG ZOFRAN
IV, 20 MG PEPCID IV AND OFIMEV 1000 MG IV INFUSING AS ORDERED

## 2017-06-28 NOTE — HISTORY & PHYSICAL
ASHIA MALDONADO MD 17 1529:
General Information and HPI
History of Present Illness:
52 year old woman with past medical history of GERD s/p Nissen fundiplication, 
low back pain 2/2 disc herniation on chronic opiates, HLD, and uterine fibroids 
s/p hysterectoy seen for evaluation of acute onset chest pain.
 
Patient reports acute onset 10/10 sharp epigastric chest pain waking her from 
sleep radiating around the left chest wall to her back with associated 
palpitations. She tried taking mylanta but it did not offer her any relief, no 
seemed to make it worse. She waited approximately two hours before driving 
herself to the [facility] ED for further evaluation.
 
Upon arrival she describes developedment of acute onset abdominal pain with 
associated nausea and frequent loose, watery non-bloody stools. She denies any 
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 
diarrhea.
 
Otherwise she denies any blurred/double vision, lightheadedness/dizziness, 
headache, fever, chills, shortness of breath, vomiting, urinary discomfort/
urgency/frequency/incontinence, or constipation.
 
 
 
Allergies/Medications
Allergies:
Coded Allergies:
Influenza Virus Vaccines (ITCHING 17)
morphine (REDNESS, ITCHING, SWELLING 17)
tetanus immune globulin (UNKNOWN 17)
tetanus toxoid, adsorbed (RASH 17)
prochlorperazine (From Compazine) (STROKE-LIKE SYMPTOMS 17)
 
Home Med list
Cyclobenzaprine HCl 10 MG TABLET   1 TAB PO QPM MUSCLE SPASMS  (Reported)
Diclofenac Sodium 75 MG TABLET.DR   1 TAB PO BID PRN PAIN
Hyoscyamine (Levsin) 0.125 MG TABLET   1-2 TAB PO Q6P PRN ABDOMINAL CRAMPS
Meclizine HCl 12.5 MG TABLET   1 TAB PO BID VERTIGO  (Reported)
Ondansetron (Zofran Odt) 4 MG TAB.RAPDIS   1 TAB SL Q6P PRN NAUSEA/VOMITING
Oxycodone HCl 15 MG TABLET   1 TAB PO Q6 PAIN  (Reported)
Pantoprazole Sodium 40 MG TABLET.DR   1 TAB PO DAILY GI  (Reported)
Simvastatin (Zocor*) 20 MG TABLET   1 TAB PO QPM CHOLESTEROL  (Reported)
 
 
Past History
 
Travel History
Traveled to [place] past 21 day No
 
Medical History
Neurological: NONE
EENT: allergies
Cardiovascular: hyperlipidemia
Respiratory: NONE
Gastrointestinal: GERD
Hepatic: NONE
Renal: NONE
Musculoskeletal: disk herniation, sciatica
Psychiatric: NONE
Endocrine: NONE
Blood Disorders: NONE
Cancer(s): NONE
GYN/Reproductive: NONE
History of MRSA: No
History of VRE: No
History of CDIFF: No
 
Surgical History
Surgical History: , hysterectomy, tubal ligation, "ACID REFLUX SX" LEFT
KNEE SURGERY, Nissen fundiplication
 
Past Family/Social History
 
Family History
Relations & Conditions if any
FATHER
MOTHER
BROTHER
SISTER
Relation not specified for:
  FH: asthma
  FH: colon cancer
  FH: Crohn's disease
  FH: early death
  FH: pancreatic cancer
  Hypertrophic cardiomyopathy
 
 
Psychosocial History
Where do you live? Home
Who Do You Live With? spouse
Services at Home: None
Primary Language: English
Smoking Status: Never Smoked
ETOH Use: occasional use
 
Functional Ability
ADLs
Independent: dressing, eating, toileting, bathing. 
Ambulation: independent
IADLs
Independent: shopping, housework, finances, food prep, telephone, transportation
, medication admin. 
 
Review of Systems
 
Review of Systems
Constitutional:
Reports: see HPI. 
 
Exam & Diagnostic Data
Last 24 Hrs of Vital Signs/I&O
 Vital Signs
 
 
Date Time Temp Pulse Resp B/P B/P Pulse O2 O2 Flow FiO2
 
     Mean Ox Delivery Rate 
 
 1444 98.2 78 18 120/63  99 Room Air  
 
 1141 98.4 74 20 109/64  99 Room Air  
 
 0903 98.4 105 18 138/83  99 Room Air  
 
 0651 96.7 94 18 131/79  99 Room Air  
 
 0449 96.4        
 
 0449 96.4 95 18 141/83  99 Room Air  
 
 0251      97 Room Air  
 
 0222 96.3 104 18 144/84  99 Room Air  
 
 
 Intake & Output
 
 
  1600  0800  0000
 
Intake Total  1000 
 
Output Total   
 
Balance  1000 
 
    
 
Intake, IV  1000 
 
Patient  74.843 kg 
 
Weight   
 
Weight  Reported by Patient 
 
Measurement   
 
Method   
 
 
 
 
Physical Exam
General Appearance Alert, Oriented X3, Cooperative, No Acute Distress
Skin No Rashes, No Breakdown, No Significant Lesion
HEENT Atraumatic, EOMI, Mucous Membr. moist/pink
Neck Supple, No JVD, +2 Carotid Pulse wo Bruit
Cardiovascular Regular Rate, Normal S1, Normal S2, No Murmurs
Lungs Clear to Auscultation, Normal Air Movement
Abdomen Normal Bowel Sounds, Soft, No Masses, Moderate/Severe Epigastric 
tenderness without guarding/rigidity, Nondistended
Neurological Normal Speech, Normal Tone, Cranial Nerves 3-12 NL
Vascular Normal Pulses, Pulses Symmetrical
Last 24 Hrs of Labs/Candido:
 Laboratory Tests
 
17 0646:
Urine Color YEL, Urine Clarity CLEAR, Urine pH 6.0, Ur Specific Gravity <= 1.005
, Urine Protein NEG, Urine Ketones NEG, Urine Nitrite NEG, Urine Bilirubin NEG, 
Urine Urobilinogen 0.2, Ur Leukocyte Esterase NEG, Ur Microscopic SEDIMENT 
EXAMINED, Urine RBC 1-3, Urine WBC 1-3  H, Ur Epithelial Cells FEW, Urine 
Bacteria FEW  H, Urine Hemoglobin TRACE-LYSED, Urine Glucose NEG
 
17 0305:
Anion Gap 11, Estimated GFR > 60, BUN/Creatinine Ratio 10.0, Glucose 118  H, 
Calcium 9.3, Total Bilirubin 0.7, AST 33, ALT 48, Alkaline Phosphatase 76, 
Troponin I < 0.01, Total Protein 6.7, Albumin 4.0, Globulin 2.7, Albumin/
Globulin Ratio 1.5, Lipase 147, CBC w Diff NO MAN DIFF REQ, RBC 4.02  L, MCV 
91.4, MCH 31.2  H, RDW 13.1, MPV 8.6, Gran % 60.4, Lymphocytes % 30.4, Monocytes
% 6.6, Eosinophils % 1.7, Basophils % 0.9, Absolute Granulocytes 4.0, Absolute 
Lymphocytes 2.0, Absolute Monocytes 0.4, Absolute Eosinophils 0.1, Absolute 
Basophils 0.1, PUBS MCHC 34.1
 Microbiology
 1547  STOOL: Stool Culture - COLB
 
 
 
Diagnostic Data
EKG Results
NSR
HR 97
No ST-segment changes
Other Results
SERVICE DATE: 
EXAM TYPE: CAT - CT ABD ANGIOGRAM; CT PELVIS ANGIOGRAM; CTA CHEST
IMPRESSION:
No evidence of acute vascular dissection. There is no abnormal finding to
provide an explanation for the patient's chest, abdominal, and back pain.
 
Assessment/Plan
Assessment:
52 year old woman with multiple medical problems significant for GERD s/p Nissen
fundoplication, uterine fibroids s/p hysterecomy, and frequent ED visits/
hospitalizations seen for evaluation for acute onset chest pain. Vital signs 
upon initial evaluation were within normal limits. Physical examination is 
remarkable only for moderate/severe epigastric tenderness. CBC/BEP/LFT/Lipase/UA
/EKG within normal limits. Troponin negative. CTA Chest/Abdomen/Pelvis negative 
for acute pathology. Patient received multiple antiemetics and intravenous 
fluids in the ED with moderate improvement of her symptoms. Clinically it 
appears patients symptoms are multifactorial and are a result of her GERD with 
noncompliance of PPI and probable superimposed viral gastroenteritis; however 
given her history of Present illness acute coronary syndrome must be ruled out. 
Patient placed under observation on the telemetry floor for further evaluation:
 
Problem List:
-Acute onset Chest/Abdomen pain, probable GERD/Gastroenteritis - r/o ACS
-History of GERD s/p Nissen Fundoplication
-History of Fibroids s/p hysterectomy
-Low back pain on opiates
-Sciatica
-Hyperlipidemia
 
Plan:
-Telemetry Obs
-Troponin/EKG x3
-NS @ 75mL/hr
-Protonix 40mg IV Daily
-Zofran 4mg Q6H IV PRN Nausea
-Pain control with Acetaminophen/Dilaudid
-Clear Liquid Diet
-DVT PPx with subcutaneous heparin
-FULL CODE
 
As Ranked By This Provider
Problem List:
 1. Gastroesophageal reflux disease with hiatal hernia
 
 
Core Measures/Miscellaneous
 
Acute Coronary Syndrome
ACS Diagnosis: No
 
Cerebrovascular Accident
CVA/TIA Diagnosis: No
 
Congestive Heart Failure
CHF Diagnosis: No
 
VTE (View Protocol)
VTE Risk Factors: Acute medical illness, Age > 40, Obesity
No [facility] VTE prophylaxis d/t: No contraindications
No VTE Pharm Prophylaxis d/t: No contraindications
VTE Diagnosis: No
VTE Type: NONE
VTE Confirmed by (Test): NONE
 
Sepsis (View Protocol)
Severe Sepsis Present: No
 
Septic Shock
Septic Shock Present: No
 
Miscellaneous Documentation
Attending Case Discussed With:
CHLOE MORALES,DORY
 
Primary Care Physician:
GLENN FELIX MD
 
Patient sees these Specialists
Dr. Patel - Orthopedist
Pain Management ([place], [place])
Level of Patient Care: Telemetry
 
 
JESUS GUZMAN 17 1629:
Resident Review Statement
Resident Statement: examined this patient, discussed with intern, agreed with 
intern, discussed with family, reviewed EMR data (avail), discussed with nursing
, discussed with case mgmt, reviewed images, amended to note
Other Findings:
52-year-old female with a past medical history of GERD status post Nissen 
fundoplication, hyperlipidemia, fibroids status post hysterectomy presented to 
the ED with chief complaint of chest pain that she rated as a 10 out of 10 that 
started around 12 in the morning.  She states that she initially thought that 
since it was burning sensation that maybe relieved after taking Mylanta however 
it doesn't affect her and so she decided to come to the ER.  She states that 
while she was pulling into the driveway she had very sharp epigastric pain that 
she grades at the 10 out of 10 but she states that went right through her back. 
She states since then she's had bouts of diarrhea.  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  Denies any fever or chills, urinary burning 
micturition, headache, dizziness, lightheadedness, of the patients.
 
Rest of the history please refer to Dr. Ashia Barrera's H&P above
 
Vitals at the time of admission blood pressure 120/63, respiratory rate of 18, 
pulse 78, afebrile saturating 90% on room air.
 
Physical exam she is alert, oriented 3 lying comfortably in bed.  HEENT 
revealed PERRLA, moist mucous membranes.  Examination of the neck did not reveal
an elevated JVD, cervical lymphadenopathy.  Chest clear to auscultation 
bilaterally.  Cardiovascular exam pertinent for normal S1, S2, no murmurs or 
gallops appreciated.  Abdominal exam revealed abdomen soft, tender to palpation 
in the epigastrium, normal bowel sounds in all 4 quadrants, Shipley's negative.  
Examination of lower extremities did not reveal any edema.  Neuro exam is 
grossly unremarkable.
 
Labs pertinent for normal white blood cell count of 6700, H&H of 12.5/36.7, MCV 
of 91.4 with a normal platelet count 237,000.  Serum chemistries revealed sodium
of 140, potassium 3.8, anion gap of 11, BUN 17 creatinine 0.7.  LFTs 
unremarkable with an AST/ALT of 33/48, alkaline phosphatase of 76, first set of 
troponins negative at less than 0.01.  Serum lipase within normal limits at 147.
 
CTA chest abdomen pelvis was done which revealed no evidence of acute vascular 
dissection, an abnormal finding.
 
Assessment and plan
 
Place under observation on telemetry to rule out ACS. 
 
#Atypical chest pain
Most likely secondary to GERD versus viral gastroenteritis.  Aortic dissection 
was ruled out by doing a CTA.  Pancreatitis was ruled out
Given that she is a female, we'll rule her out for ACS with troponins and EKG at
3 PM and 10 PM.
Echocardiogram in a.m.
Cardiology consult in a.m.
For now we'll hold off giving her aspirin since it does not seem like she has an
acute coronary syndrome
Start her on Protonix 40 mg twice a day
 
#BPPV
Continue on meclizine 12.5 mg twice a day
 
#Back pain that is chronic
Continue on oxycodone 15 mg every 6 when necessary, Flexeril 10 mg at bedtime 
when necessary as needed
 
#Hyperlipidemia
Continue on Lipitor 10 mg daily
 
DVT prophylaxis
On heparin 5000oh units 3 times a day subcutaneous
 
Diet
Clears for now and advance as tolerated
 
Code Status
Full code

## 2017-06-28 NOTE — NUR
PT ARRIVED TO ROOM 180-1 AT ABOUT 1800. PT STABLE. A/OX3. SPEECH CLEAR. NO
FACIAL DROOP. /70,95,98.1,99% ON RA AND 17 RESP. LUNGS CLEAR. PT
INDEPENDENT IN ROOM. PT C/O PAIN 9/10 IN MID ABD- RECIEVED PRN DILAUDID. HAT
PLACED IN TOILET FOR STOOL SAMPLE.PT ORRIENTED TO ROOM. SAFTEY MAINTAINED.
CALL BELL WITHIN REACH.

## 2017-06-28 NOTE — NUR
DIETARY CALLED STATING NO ORDER IN FOR TRAY THAT WAS ORDERED.  FULL LIQUID
ORDER PLACED, DIETARY NOTIFIED.

## 2017-06-28 NOTE — NUR
TRIAGE: PATIENT TO ER REPORTS WOKE W/ 10/10
BURNING SHARP PAIN TO MID CHEST AND INTO/ ACROSS
UPPER BACK W/ SOB. REPORTS RELIEF WHEN SITTING
"HUNCHED OVER." PATIENT REPORTS "MY STOMACH HURTS
AND I FEEL NAUSEOUS." HX GERD.

## 2017-06-29 VITALS — DIASTOLIC BLOOD PRESSURE: 70 MMHG | SYSTOLIC BLOOD PRESSURE: 108 MMHG

## 2017-06-29 VITALS — SYSTOLIC BLOOD PRESSURE: 122 MMHG | DIASTOLIC BLOOD PRESSURE: 80 MMHG

## 2017-06-29 LAB
ABSOLUTE GRANULOCYTE CT: 2.9 /CUMM (ref 1.4–6.5)
BASOPHILS # BLD: 0 /CUMM (ref 0–0.2)
BASOPHILS NFR BLD: 0.4 % (ref 0–2)
EOSINOPHIL # BLD: 0.1 /CUMM (ref 0–0.7)
EOSINOPHIL NFR BLD: 1.6 % (ref 0–5)
ERYTHROCYTE [DISTWIDTH] IN BLOOD BY AUTOMATED COUNT: 13.1 % (ref 11.5–14.5)
GRANULOCYTES NFR BLD: 54.5 % (ref 42.2–75.2)
HCT VFR BLD CALC: 33.2 % (ref 37–47)
LYMPHOCYTES # BLD: 2 /CUMM (ref 1.2–3.4)
MCH RBC QN AUTO: 31.5 PG (ref 27–31)
MCHC RBC AUTO-ENTMCNC: 34.2 G/DL (ref 33–37)
MCV RBC AUTO: 92.1 FL (ref 81–99)
MONOCYTES # BLD: 0.3 /CUMM (ref 0.1–0.6)
PLATELET # BLD: 223 /CUMM (ref 130–400)
PMV BLD AUTO: 8.9 FL (ref 7.4–10.4)
RED BLOOD CELL CT: 3.61 /CUMM (ref 4.2–5.4)
WBC # BLD AUTO: 5.3 /CUMM (ref 4.8–10.8)

## 2017-06-29 NOTE — PATIENT DISCHARGE INSTRUCTIONS
Discharge Instructions
 
General Discharge Information
Special Instructions:
Follow up with your primary care provider and various specialists after 
discharge.
 
Prescriptions for Levsin, Diclofenac, and Zofran have been prescribed by the ED 
physician and transmitted to your pharmacy.
 
Acute Coronary Syndrome
 
Inclusion Criteria
At DC or during hospital stay patient has or had the following:
ACS DIAGNOSIS No
 
Discharge Core Measures
Meds if any: Prescribed or Continued at Discharge
Meds if any: NOT Prescribed or Continued at Discharge
 
Congestive Heart Failure
 
Inclusion Criteria
At DC or during hospital stay patient has or had the following:
CHF DIAGNOSIS No
 
Discharge Core Measures
Meds if any: Prescribed or Continued at Discharge
Meds if any: NOT Prescribed or Continued at Discharge
 
Cerebrovascular accident
 
Inclusion Criteria
At DC or during hospital stay patient has or had the following:
CVA/TIA Diagnosis No
 
Discharge Core Measures
Meds if any: Prescribed or Continued at Discharge
Meds if any: NOT Prescribed or Continued at Discharge
 
Venous thromboembolism
 
Inclusion Criteria
VTE Diagnosis No
VTE Type NONE
VTE Confirmed by (Test) NONE
 
Discharge Core Measures
- Per Current guidelines, there needs to be overlap
- treatment for the first 5 days of Warfarin therapy.
- If discharged on Warfarin prior to 5 days of
- overlap therapy, the patient will need to be
- assessed for post discharge needs including
- *Post discharge parental anticoagulation
- *Warfarin and/or parental anticoagulation education
- *Follow up date to check INR post discharge
At least 5 days overlap therapy as Inpatient No
Meds if any: Prescribed or Continued at Discharge
Note: Overlap Therapy is Warfarin and Anticoagulant
Meds if any: NOT Prescribed or Continued at Discharge

## 2017-06-29 NOTE — ECHOCARDIOGRAM REPORT
EUGENIE SCOTT 
 
 Age:    52     :    1965      Gender:     F 
 
 MRN:    [id number] 
 
 Exam Date:     2017  
                19:38 
 
 Exam Location: 1  
 [place] 
 
 Ht (in):     67      Wt (lb):      165     BSA:    1.89 
 
 BP:          118     /     74 
 
 Ordering Physician:        JESUS GUZMAN MD 
 
 Referring Physician:       Cabrera Hodges MD 
 
 Technologist:              Sammie Rivera [facility] 
 
 Room Number:               180-01 
 
 Indications:       CHEST PAIN 
 
 Rhythm:                 Sinus 
 
 Technical Quality:      Good 
 
 FINDINGS 
 
 Left Ventricle 
 Normal size left ventricle. Normal left ventricular wall thickness.  
 Normal left ventricular ejection fraction visually estimated at   > 
 60%. Normal left ventricular wall motion. 
 
 Right Ventricle 
 Normal right ventricular size and function. 
 
 Right Atrium 
 Normal right atrial size. 
 
 Left Atrium 
 Normal left atrial size. 
 
 Mitral Valve 
 Mild mitral annular calcification. Trace mitral regurgitation. 
 
 Aortic Valve 
 Mildly thickened aortic valve.  No aortic stenosis. No aortic  
 regurgitation. 
 
 Tricuspid Valve 
 Tricuspid valve not well visualized, grossly normal. Trace tricuspid  
 regurgitation. No evidence of pulmonary hypertension. 
 
 Pulmonic Valve 
 Pulmonic valve not well visualized, grossly normal. 
 
 Pericardium 
 No pericardial effusion. 
 
 Great Vessels 
 Normal size aortic root. 
 
 CONCLUSIONS 
 Normal size left ventricle. 
 Normal left ventricular ejection fraction visually estimated at   > 
 60%. 
 Trace mitral regurgitation. 
 Trace tricuspid regurgitation. 
 
 Cabrera Hodges M.D. 
 (Electronically Signed) 
 Final Date:      2017  
                  11:25 
 
 MEASUREMENTS  (Male / Female) Normal Values 
 
 2D ECHO 
 LV Diastolic Diameter PLAX        4.3 cm                4.2 - 5.9 / 3.9 - 5.3 
cm 
 LV Systolic Diameter PLAX         2.6 cm                2.1 - 4.0 cm 
 LV Fractional Shortening PLAX     39.5 %                25 - 46  % 
 LV Ejection Fraction 2D Teich     70.4 %                 
 IVS Diastolic Thickness           0.8 cm                 
 LVPW Diastolic Thickness          0.9 cm                 
 LV Relative Wall Thickness        0.4                    
 RV Internal Dim ED PLAX           2.9 cm                1.9 - 3.8 cm 
 LVOT Diameter                     1.8 cm                 
 Aortic Root Diameter              2.7 cm                 
 LA Systolic Diameter LX           3.3 cm                3.0 - 4.0 / 2.7 - 3.8 
cm 
 LA Volume                         29.0 cm              18 - 58 / 22 - 52 cm 
 Ascending Aorta Diameter          2.9 cm                 
 
 DOPPLER 
 AV Peak Velocity                  136.0 cm/s             
 AV Peak Gradient                  7.4 mmHg               
 AV Mean Velocity                  95.0 cm/s              
 AV Mean Gradient                  4.0 mmHg               
 AV Velocity Time Integral         25.3 cm                
 LVOT Peak Velocity                110.0 cm/s             
 LVOT Peak Gradient                4.8 mmHg               
 LVOT Mean Velocity                76.2 cm/s              
 LVOT Mean Gradient                3.0 mmHg               
 LVOT Velocity Time Integral       21.9 cm                
 LVOT Stroke Volume                55.7 cm               
 AV Area Cont Eq vti               2.2 cm                
 AV Area Cont Eq pk                2.1 cm                
 MV Peak Velocity                  101.0 cm/s             
 MV Peak Gradient                  4.1 mmHg               
 MV Mean Velocity                  66.1 cm/s              
 MV Mean Gradient                  2.0 mmHg               
 Mitral E Point Velocity           78.0 cm/s              
 Mitral A Point Velocity           93.8 cm/s              
 Mitral E to A Ratio               0.8                    
 MV PHT Velocity                   90.3 cm/s              
 MV Deceleration [place]             471.0 cm/s            
 MV Pressure Half Time             57.5 ms                
 MV Area PHT                       3.8 cm                
 MV Deceleration Time              169.0 ms               
 TR Peak Velocity                  252.0 cm/s             
 TR Peak Gradient                  25.4 mmHg              
 Right Atrial Pressure             5.0 mmHg               
 Pulmonary Artery Systolic Pressu  30.4 mmHg              
 Right Ventricular Systolic Press  30.4 mmHg              
 PV Peak Velocity                  93.7 cm/s              
 PV Peak Gradient                  3.5 mmHg               
 PV Mean Velocity                  75.2 cm/s              
 PV Mean Gradient                  2.0 mmHg               
 PV Velocity Time Integral         20.6 cm                
 LV E' Lateral Velocity            11.8 cm/s              
 Mitral E to LV E' Lateral Ratio   6.6                    
 LV E' Septal Velocity             8.8 cm/s               
 Mitral E to LV E' Septal Ratio    8.9

## 2017-06-29 NOTE — PN- ATT ADDEND
Attending Addendum
Attending Brief Note
Patient seen and examined.  Plan of care discussed with the medical team and the
patient.  Available lab work and radiology test reports were reviewed.  Patient 
is complaining of a headache since this morning.  Headache is mostly frontal.  
Her nausea vomiting has decreased and that he has stopped as of last night.  He 
complains of moderate  crampy abdominal pain without any fever or chills.
 Vital Signs
 
 
Date Time Temp Pulse Resp B/P B/P Pulse O2 O2 Flow FiO2
 
     Mean Ox Delivery Rate 
 
06/29 0717 97.7 83 20 108/70  97 Room Air  
 
06/28 2303 98.3 98 20 128/84  94 Room Air  
 
06/28 1800 98.1 95 17 110/70  99 Room Air  
 
06/28 1707  97 18 118/74  98 Room Air  
 
06/28 1444 98.2 78 18 120/63  99 Room Air  
 
06/28 1141 98.4 74 20 109/64  99 Room Air  
 
 
 Intake & Output
 
 
 06/29 1600 06/29 0800 06/29 0000
 
Intake Total   260
 
Output Total   
 
Balance   260
 
    
 
Intake, IV   10
 
Intake, Oral   250
 
Patient   165 lb
 
Weight   
 
 
Exam: 
General: Patient awake alert oriented with mild distress due to headache  
CVS: S1 plus S2 without any murmur or gallops 
Chest: Few scattered crepitation without any wheeze.  There is no respiratory 
distress. 
Abdomen: Soft and nondistended with the mild discomfort in epigastric area.   
bowel sound present, no guarding or rebound 
CNS: Awake alert oriented without any focal neuro deficit and follows command  
appropriately 
Extremities: No edema; no clubbing or cyanosis noted.  
 Laboratory Tests
 
 
 06/29 06/28 06/28 06/28
 
 0648 2203 1705 1705
 
Chemistry    
 
  Sodium (137 - 145 mmol/L) 140   
 
  Potassium (3.5 - 5.1 mmol/L) 4.2   
 
  Chloride (98 - 107 mmol/L) 108  H   
 
  Carbon Dioxide (22 - 30 mmol/L) 23   
 
  Anion Gap (5 - 16) 9   
 
  BUN (7 - 17 mg/dL) 6  L   
 
  Creatinine (0.5 - 1.0 mg/dL) 0.6   
 
  Estimated GFR (>60 ml/min) > 60   
 
  BUN/Creatinine Ratio (7 - 25 %) 10.0   
 
  Lactic Acid (0.7 - 2.1 mmol/L)    1.3
 
  Troponin I (< 0.11 ng/ml)  < 0.01 < 0.01 
 
Hematology    
 
  CBC w Diff NO MAN DIFF REQ   
 
  WBC (4.8 - 10.8 /CUMM) 5.3   
 
  RBC (4.20 - 5.40 /CUMM) 3.61  L   
 
  Hgb (12.0 - 16.0 G/DL) 11.4  L   
 
  Hct (37 - 47 %) 33.2  L   
 
  MCV (81.0 - 99.0 FL) 92.1   
 
  MCH (27.0 - 31.0 PG) 31.5  H   
 
  RDW (11.5 - 14.5 %) 13.1   
 
  Plt Count (130 - 400 /CUMM) 223   
 
  MPV (7.4 - 10.4 FL) 8.9   
 
  Gran % (42.2 - 75.2 %) 54.5   
 
  Lymphocytes % (20.5 - 51.1 %) 37.7   
 
  Monocytes % (1.7 - 9.3 %) 5.8   
 
  Eosinophils % (0 - 5 %) 1.6   
 
  Basophils % (0.0 - 2.0 %) 0.4   
 
  Absolute Granulocytes (1.4 - 6.5 /CUMM) 2.9   
 
  Absolute Lymphocytes (1.2 - 3.4 /CUMM) 2.0   
 
  Absolute Monocytes (0.10 - 0.60 /CUMM) 0.3   
 
  Absolute Eosinophils (0.0 - 0.7 /CUMM) 0.1   
 
  Absolute Basophils (0.0 - 0.2 /CUMM) 0   
 
  PUBS MCHC (33.0 - 37.0 G/DL) 34.2   
 
 
 Microbiology
 
 
Date/Time Procedure - Status
 
Source Growth
 
06/29 0622 Stool Culture - RECD
 
STOOL 
 
 
CTA abdomen/ chest/pelvic CT 
No evidence of acute vascular dissection. There is no abnormal finding to
provide an explanation for the patient's chest, abdominal, and back pain.
 
EKG is in normal sinus rhythm without any acute ischemic changes.
 
Assessment
* Acute gastritis probably viral
* History of chronic back pain, status post epidural injections the past
* History of GERD
* History of Nissen fundoplication
* Chest pain rule out MI- patient's father had heart disease and mother had 
cardiomyopathy
Plan
* Can DC telemetry if no events are noted; patient troponins have been negative
* Encourage oral liquids ; advance diet as tolerated 
* Change PPI to oral
* Order Toradol when necessary for severe headache
* Add Motrin 400 mg every 6 hours when necessary for headache
* Out of bed and ambulate
* Patient can be discharged home today if no further diarrhea.
*  consult for assistance with disability application

## 2017-06-29 NOTE — PN-OBSERVATION
Observation Note
 
Observation Note
_
I have personally examined EUGENIE SCOTT. her disposition is uncertain at this
time. Before a determination can be made, she requires continued observation for
the following reasons:
* Treatment with intravenous fluids / protonix 
* Advancement of diet
* Serial troponins/EKGs
 
 
Assessment/Plan
Assessment:
52 year old woman with multiple medical problems significant for GERD s/p Nissen
fundoplication, uterine fibroids s/p hysterecomy, and frequent ED visits/
hospitalizations seen for evaluation for acute onset chest pain. Vital signs 
upon initial evaluation were within normal limits. Physical examination is 
remarkable only for moderate/severe epigastric tenderness. CBC/BEP/LFT/Lipase/UA
/EKG within normal limits. Troponin negative. CTA Chest/Abdomen/Pelvis negative 
for acute pathology. Patient received multiple antiemetics and intravenous 
fluids in the ED with moderate improvement of her symptoms. Clinically it 
appears patients symptoms are multifactorial and are a result of her GERD with 
noncompliance of PPI and probable superimposed viral gastroenteritis; however 
given her history of Present illness acute coronary syndrome must be ruled out. 
Patient placed under observation on the telemetry floor for further evaluation.
 
#Acute onset Chest/Abdomen pain, probable GERD/Gastroenteritis - r/o ACS
#History of GERD s/p Nissen Fundoplication
#History of Fibroids s/p hysterectomy
#Low back pain on opiates
#Sciatica
#Hyperlipidemia
Patient was continued on the telemetry floor with intravenous fluids, 
antiemetics, and protonix. Serial troponins/EKG were unremarkable. Patient's 
diet was advanced as her symptoms slowly began to improve. She experienced a new
headache the well controlled with NSAIDs. Patient had a small irregular bowel 
movement described as grey and fibrous by nursing staff which was sent to the 
lab for analysis. Social work consult was placed as patient was requesting 
information regarding disability due to her chronic low back pain. 
-Telemetry discontinued
-NS @ 75mL/hr
-Zofran 4mg IV Q6H PRN Nausea
-Cyclobenzaprine
-Atorvastatin 10mg PO Daily
-Meclizine 12.5mg PO BID
-Oxycodone 15mg PO Q6H
-Discharge patient to home after lunch if she tolerates diet
 
Pain Plan-Acetaminophen/Ibuprofen/Dilaudid
Diet-Regular Diet
DVT PPx-subcutaneous heparin
Code Status-FULL CODE
 
Problem List:
 1. GERD (gastroesophageal reflux disease)
 
 
Subjective
Follow-up For:
Chest Pain
Tele-Events Since Last Visit:
NSR/ST
HR 
No events
Subjective:
Patient seen and examined. She is seen lying flat in bed resting comfortably. 
She appears to be in no acute distress. She reports that her symptoms of nausea,
chest pain, and shortness of breath have all but resolved, but admits that she 
still has some upper abdominal discomfort. She reports have a small 'gray' 
appearing bowel movement this morning. Additionally she states that she now has 
a headache. She is requesting to speak to someone about her low back pain and '
getting disability'.
 
Otherwise she denies any fever, chills, palpitations, vomiting.
 
Review of Systems
Constitutional:
Reports: see HPI. 
 
Objective
Last 24 Hrs of Vital Signs/I&O
 Vital Signs
 
 
Date Time Temp Pulse Resp B/P B/P Pulse O2 O2 Flow FiO2
 
     Mean Ox Delivery Rate 
 
06/29 0717 97.7 83 20 108/70  97 Room Air  
 
06/28 2303 98.3 98 20 128/84  94 Room Air  
 
06/28 1800 98.1 95 17 110/70  99 Room Air  
 
06/28 1707  97 18 118/74  98 Room Air  
 
06/28 1444 98.2 78 18 120/63  99 Room Air  
 
 
 Intake & Output
 
 
 06/29 1600 06/29 0800 06/29 0000
 
Intake Total  960 260
 
Output Total  400 
 
Balance  560 260
 
    
 
Intake, IV  600 10
 
Intake, Oral  360 250
 
Number  1 
 
Bowel   
 
Movements   
 
Output, Urine  400 
 
Patient 74.843 kg  74.843 kg
 
Weight   
 
 
 
 
Physical Exam
General Appearance: Alert, Oriented X3, Cooperative, No Acute Distress
Other Physical Findings:
General- well developed, well nourished middled aged  woman in no acute
distress
HEENT-NCAT, PERRL, EOMI, anicteric sclera
Chest-S1, S2 w/o m/g/r
Lung-CTA bilaterally
Abdomen-Soft, mild/moderate epigastric tenderness without guarding/rigidity, 
obese, bowel sounds intact
Neuro-Awake and alert, CN II-XII grossly intact
Ext-normal pulses, no cyanosis/clubbing/edema
Current Medications:
 Current Medications
 
 
  Sig/Sheela Start time  Last
 
Medication Dose Route Stop Time Status Admin
 
Acetaminophen 650 MG .STK-MED ONE 06/29 0110 DC 
 
  PO 06/29 0111  
 
Acetaminophen 650 MG Q6P PRN 06/28 1600 AC 06/29
 
  PO   0109
 
Atorvastatin Calcium 10 MG 1700 06/28 1700 AC 06/28
 
  PO   1916
 
Cyclobenzaprine HCl 10 MG QPM PRN 06/28 1700 AC 
 
  PO   
 
Diphenhydramine HCl 0 .STK-MED ONE 06/28 1440 DC 
 
  .ROUTE   
 
Diphenhydramine HCl 0 .STK-MED ONE 06/28 1437 DC 
 
  .ROUTE   
 
Diphenhydramine HCl 25 MG ONCE ONE 06/28 1430 DC 06/28
 
  IV 06/28 1431  1442
 
Heparin Sodium  5,000 UNIT Q8 06/28 2200 AC 
 
(Porcine)  SC   
 
Hydromorphone HCl 0.6 MG Q4P PRN 06/28 1700 AC 06/29
 
  IV   0737
 
Hydromorphone HCl 0.2 MG Q4P PRN 06/28 1600 AC 
 
  IV   
 
Hyoscyamine 0.125 MG Q4 HRS AS NEEDED PRN 06/28 1645 AC 
 
  PO   
 
Ibuprofen 400 MG Q6P PRN 06/29 1200 AC 
 
  PO   
 
Ketorolac  15 MG Q6P PRN 06/29 1015 AC 06/29
 
Tromethamine  IV 07/02 1014  1035
 
Meclizine HCl 12.5 MG BID 06/28 2200 AC 06/29
 
  PO   0941
 
Metoclopramide HCl 0 .STK-MED ONE 06/28 1437 DC 
 
  .ROUTE   
 
Metoclopramide HCl 5 MG ONCE ONE 06/28 1430 DC 06/28
 
  IV 06/28 1431  1442
 
Omeprazole 40 MG DAILY AC 06/30 0700 AC 
 
  PO   
 
Omeprazole 40 MG DAILY AC 06/29 0700 CAN 
 
  PO   
 
Ondansetron HCl 4 MG Q6P PRN 06/28 1600 AC 06/29
 
  IV   0025
 
Oxycodone HCl 15 MG Q6P PRN 06/28 1645 AC 06/29
 
  PO   0856
 
Pantoprazole Sodium 40 MG DAILY 06/29 1000 DC 
 
  IV   
 
Pantoprazole Sodium 40 MG BID 06/29 1000 DC 06/29
 
  IV   0941
 
Ramelteon 8 MG ONCE ONE 06/28 2145 DC 06/28
 
  PO 06/28 2146  2208
 
Sodium Chloride 1,000 ML Q13H 06/28 1600 AC 06/29
 
  IV 06/29 1759  0343
 
Sodium Chloride 1,000 ML ONCE ONE 06/28 1345 DC 06/28
 
  IV 06/28 2024  1442
 
 
 
Last 24 Hrs of Labs/Mics:
 Laboratory Tests
 
06/29/17 0648:
Anion Gap 9, Estimated GFR > 60, BUN/Creatinine Ratio 10.0, CBC w Diff NO MAN 
DIFF REQ, RBC 3.61  L, MCV 92.1, MCH 31.5  H, RDW 13.1, MPV 8.9, Gran % 54.5, 
Lymphocytes % 37.7, Monocytes % 5.8, Eosinophils % 1.6, Basophils % 0.4, 
Absolute Granulocytes 2.9, Absolute Lymphocytes 2.0, Absolute Monocytes 0.3, 
Absolute Eosinophils 0.1, Absolute Basophils 0, PUBS MCHC 34.2
 
06/28/17 2203:
Troponin I < 0.01
 
06/28/17 1705:
Troponin I < 0.01
 
06/28/17 1705:
Lactic Acid 1.3
 Microbiology
06/29 0622  STOOL: Stool Culture - RECD

## 2017-06-29 NOTE — PN- STUDENT
EUGENIO,CORINNE 06/29/17 0723:
Subjective
Subjective:
Overnight events: Normal sinus rhythm between 80-94, sinus tachy at 0200, PJCs @
0200. 
 
Pt. is laying in bed, in no accute distress. She has a 10/10, sharp, pounding 
headache, which is slightly relieved with tylenol. Last night she states she 
experienced two episodes of shortness of breath and sharp chest pain that lasted
two minutes. She sat up, repositioned, and took deep breaths and it was 
relieved. She is still experiencing intermittent, sharp, epigastric pain, which 
is tolerable with the dilaudid and oxycodone. She states her nausea has 
improved.  She still feels "gassy and gurgly" and she is burping acid. She had 
one episode of loose stool this morning. The nurse described it as lidia colored 
with "fibrous ribbons" and it was sent for a stool culture. 
 
The pt. states she is tolerating liquids and wishes to advance her diet. 
 
This afternoon she is sitting in bed and eating her lunch. She states she has no
more nausea and she is tolerating her meal so far. She states that the Toradol 
helped with her headache, but it coming back. She denies any other episodes of 
diarrhea. 
 
ROS: Denies dizziness, light headedness, palpitations, fevers, pain upon 
urination. 
 
Objective
Objective:
VS: Temp 97.7, HR 83, RR 20, /70, O2 97% RA
 
PE:
General: Calm, cooperative, in no apparent distress. Affect is appropriate to 
situation
Neuro: CN 2-12 grossly intact
CV: Regular rhythm, Apical , S1 and S2 normal, no mumurs, rubs, or gallops
Pulm: Clear posterior breath sounds bilaterally. No wheezes, rales, or rhonchi
GI: Soft, nondistended. Mild epigastric tenderness
LE: No edema present. 2+ DP and PT. 
 
Results
Results:
Laboratory Tests
 
06/29/17 0648:
Anion Gap 9, Estimated GFR > 60, BUN/Creatinine Ratio 10.0, CBC w Diff NO MAN 
DIFF REQ, RBC 3.61  L, MCV 92.1, MCH 31.5  H, RDW 13.1, MPV 8.9, Gran % 54.5, 
Lymphocytes % 37.7, Monocytes % 5.8, Eosinophils % 1.6, Basophils % 0.4, 
Absolute Granulocytes 2.9, Absolute Lymphocytes 2.0, Absolute Monocytes 0.3, 
Absolute Eosinophils 0.1, Absolute Basophils 0, PUBS MCHC 34.2
 
06/28/17 2203:
Troponin I < 0.01
 
06/28/17 1705:
Troponin I < 0.01
 
06/28/17 1705:
Lactic Acid 1.3
 
06/28/17 0646:
Urine Color YEL, Urine Clarity CLEAR, Urine pH 6.0, Ur Specific Gravity <= 1.005
, Urine Protein NEG, Urine Ketones NEG, Urine Nitrite NEG, Urine Bilirubin NEG, 
Urine Urobilinogen 0.2, Ur Leukocyte Esterase NEG, Ur Microscopic SEDIMENT 
EXAMINED, Urine RBC 1-3, Urine WBC 1-3  H, Ur Epithelial Cells FEW, Urine 
Bacteria FEW  H, Urine Hemoglobin TRACE-LYSED, Urine Glucose NEG
 
06/28/17 0305:
Anion Gap 11, Estimated GFR > 60, BUN/Creatinine Ratio 10.0, Glucose 118  H, 
Calcium 9.3, Total Bilirubin 0.7, AST 33, ALT 48, Alkaline Phosphatase 76, 
Troponin I < 0.01, Total Protein 6.7, Albumin 4.0, Globulin 2.7, Albumin/
Globulin Ratio 1.5, Lipase 147, CBC w Diff NO MAN DIFF REQ, RBC 4.02  L, MCV 
91.4, MCH 31.2  H, RDW 13.1, MPV 8.6, Gran % 60.4, Lymphocytes % 30.4, Monocytes
% 6.6, Eosinophils % 1.7, Basophils % 0.9, Absolute Granulocytes 4.0, Absolute 
Lymphocytes 2.0, Absolute Monocytes 0.4, Absolute Eosinophils 0.1, Absolute 
Basophils 0.1, PUBS MCHC 34.1
 Microbiology
06/29 0622  STOOL: Stool Culture - RECD
 
 
 
Assessment/Plan
Assessment:
Pt. is a 51 yo female with a history of GERD s/p Nissen fundoplication, chronic 
low back pain on oxycodone, and HLD on simvastatin who was in her usual state of
health until one day ago when she presented with an acute, sudden, sharp 10/10 
epigastric pain that radiates to the back; 6 episodes of water, nonbloody 
diarrhea; acid reflux; and nausea, which is suggestive of a gastroenteritis. 
 
 
Problem List
 
-Nausea, diarrhea, epigastric pain, GERD, HA, HL: At the time of admission, she 
was afebrile, normotensive, slightly tachycardic at 104, and saturating at 97% 
on RA. EKG, BEP, UA, lipase were within normal limits. A chest/abdomen/pelvis 
CTA showed no abnormal findings. Troponin was negative. With this patient's 
presentation, negative lab findings, and her occupation as a , a 
combination of GERD and gastroenteritis is the most likely diagnosis. Although 
MI is less likely, she will be admitted to tele for observation to rule out MI.
* Admit to tele for observation: She is hemodynamically stable. D/c tele
* Oral hydration as tolerated
* Clear liquids, advance diet as tolerated. 
* Pantoprazole 40mg IV BID for acid reflux: switch to PO
* Zofran 4mg q6h PO PRN for nausea
* Acetaminophen 650mg q6h PO PRN for mild/moderate pain
* Dilaudid 0.6mg q4h PO PRN for severe pain
* Motrin 400mg q6h PO PRN for HA
* Simvastatin 20mg PO every night for cholesterol
* Echo: Showed normal left ventricle with EF>60%. Trace mitral and tricuspid 
regurg
* Toradol for HA
* Follow up with stool cultures
 
-Back pain
* Flexiril 10mg every night PO PRN
* Oxycodone 15mg q6h PO PRN 
 
-Other
* Ambulatation
* Stable for discharge
*  consult

## 2017-06-30 NOTE — NUR
Late Entry:  Aware of patients discharge last evening.  Patient had been
placed in observation due to intractable nausea.  Referral received via
electronic  from Dr. Dewitt as patient is seeking asssitance with
disability application process.  Met with patient yesterday afternoon prior to
her discharge.  Domonique reports that she filed her application for disability
in May, and has not heard back from social security.  I suggested to Domonique
that she either go to the social security office or call to follow up, and she
agreed that she would try to get to the office today.

## 2018-04-06 ENCOUNTER — HOSPITAL ENCOUNTER (EMERGENCY)
Dept: HOSPITAL 68 - ERH | Age: 53
End: 2018-04-06
Payer: COMMERCIAL

## 2018-04-06 VITALS — BODY MASS INDEX: 25.11 KG/M2 | WEIGHT: 160 LBS | HEIGHT: 67 IN

## 2018-04-06 VITALS — SYSTOLIC BLOOD PRESSURE: 142 MMHG | DIASTOLIC BLOOD PRESSURE: 80 MMHG

## 2018-04-06 DIAGNOSIS — J18.9: Primary | ICD-10-CM

## 2018-04-06 LAB
ABSOLUTE GRANULOCYTE CT: 13.9 /CUMM (ref 1.4–6.5)
BASOPHILS # BLD: 0 /CUMM (ref 0–0.2)
BASOPHILS NFR BLD: 0.3 % (ref 0–2)
EOSINOPHIL # BLD: 0 /CUMM (ref 0–0.7)
EOSINOPHIL NFR BLD: 0.2 % (ref 0–5)
ERYTHROCYTE [DISTWIDTH] IN BLOOD BY AUTOMATED COUNT: 12.6 % (ref 11.5–14.5)
GRANULOCYTES NFR BLD: 85.7 % (ref 42.2–75.2)
HCT VFR BLD CALC: 38.8 % (ref 37–47)
LYMPHOCYTES # BLD: 1.6 /CUMM (ref 1.2–3.4)
MCH RBC QN AUTO: 30.7 PG (ref 27–31)
MCHC RBC AUTO-ENTMCNC: 34.1 G/DL (ref 33–37)
MCV RBC AUTO: 90.1 FL (ref 81–99)
MONOCYTES # BLD: 0.6 /CUMM (ref 0.1–0.6)
PLATELET # BLD: 291 /CUMM (ref 130–400)
PMV BLD AUTO: 9 FL (ref 7.4–10.4)
RED BLOOD CELL CT: 4.31 /CUMM (ref 4.2–5.4)
WBC # BLD AUTO: 16.3 /CUMM (ref 4.8–10.8)

## 2018-04-06 NOTE — ED INFLUENZA/URI COMPLAINT
History of Present Illness
 
General
Chief Complaint: Chest Pain
Stated Complaint: CP,SHAKING,COUGHING UP MUCOUS
Source: patient
Exam Limitations: no limitations
 
Vital Signs & Intake/Output
Vital Signs & Intake/Output
 Vital Signs
 
 
Date Time Temp Pulse Resp B/P B/P Pulse O2 O2 Flow FiO2
 
     Mean Ox Delivery Rate 
 
 2248 98.0 96 20 142/80  99 Room Air  
 
 1841 99.2        
 
 1828 99.1 113 18 132/78  98   
 
 1748 98.9 110 18 114/72  95 Room Air  
 
 1508 99.9        
 
 1458 99.9 130 20 122/89  93 Room Air  
 
 
 ED Intake and Output
 
 
  0000  1200
 
Intake Total 2000 
 
Output Total 150 
 
Balance 1850 
 
   
 
Intake, IV 2000 
 
Output, Urine 150 
 
Patient 160 lb 
 
Weight  
 
 
 
Allergies
Coded Allergies:
Influenza Virus Vaccines (ITCHING 17)
tetanus immune globulin (UNKNOWN 17)
tetanus toxoid, adsorbed (RASH 17)
prochlorperazine (From Compazine) (STROKE-LIKE SYMPTOMS 17)
 
Reconcile Medications
Codeine Phosphate/Guaifenesi (Cheratussin AC Syrup) 10 MG-100 MG/5 ML LIQUID   
10 ML PO Q6H PRN COUGH
Codeine Phosphate/Guaifenesi (Cheratussin AC Syrup) 10 MG-100 MG/5 ML LIQUID   
10 ML PO Q6H PRN PAIN 
Cyclobenzaprine HCl 10 MG TABLET   1 TAB PO QPM MUSCLE SPASMS  (Reported)
Diclofenac Sodium 75 MG TABLET.DR   1 TAB PO BID PRN PAIN
Hyoscyamine (Levsin) 0.125 MG TABLET   1-2 TAB PO Q6P PRN ABDOMINAL CRAMPS
Ibuprofen 800 MG TABLET   1 TAB PO TID PRN pain/fever 
Ibuprofen 800 MG TABLET   1 TAB PO TID PRN PAIN/FEVER 
Levofloxacin (Levaquin) 750 MG TABLET   1 TAB PO DAILY pna
Levofloxacin (Levaquin) 750 MG TABLET   1 TAB PO DAILY pna
Meclizine HCl 12.5 MG TABLET   1 TAB PO BID VERTIGO  (Reported)
Ondansetron (Zofran Odt) 4 MG TAB.RAPDIS   1 TAB SL Q6P PRN NAUSEA/VOMITING
Oxycodone HCl 15 MG TABLET   1 TAB PO Q6 PAIN  (Reported)
Pantoprazole Sodium 40 MG TABLET.DR   1 TAB PO DAILY GI  (Reported)
Simvastatin (Zocor*) 20 MG TABLET   1 TAB PO QPM CHOLESTEROL  (Reported)
 
Triage Note:
PT TO ED FOR WORSENING FLU LIKE SYMPTOMS.
HAS HAD COUGH ON AND OFF SINCE .
REPORTS TODAY WAS FEELING OKAY BUT STARTED WITH
MID CHEST PAIN AND UPPER BACK PAIN WITH COUGH.
TEMP 99.9 IN TRIAGE, +YELLOW PHLEGM WITH COUGH,
MEDICATED WITH 650MG TYLENOL IN TRIAGE.  IN
TRIAGE, EKG DONE AND SHOWN TO MD.
FLU SWAB SENT FROM TRIAGE.
Triage Nurses Notes Reviewed? yes
Onset: Gradual
Duration: day(s): (2), constant, continues in ED, getting worse
Timing: single episode today
Severity: mild, moderate
Prior Episodes/Possible Cause: no prior episodes
No Modifying Factors: none
Associated Symptoms: cough, fever/chills, muscle aches, nasal congestion, nasal 
drainage
LMP (ages 10-50): post menopausal, unknown
Pregnant: No
Patient currently breastfeeds: No
HPI:
52-year-old female past medical history of hyperlipidemia presents for 
evaluation of cough, congestion, fevers, body aches.  Patient states that she's 
had a cough for the past week.  Today she noticed that she had diffuse bodyaches
fever and was coughing up yellow sputum.  She reports pain in her bilateral ribs
and back with coughing.  No hemoptysis no chest pain no shortness of breath.  
She does not smoke no history of underlying lung disease.  No recent surgery 
recent trauma no lower extremity edema.  No history of DVT.  She's not taking 
any medicine for this.
(Rubens DIAZ,Gold)
 
Past History
 
Travel History
Traveled to [place] past 21 day No
 
Medical History
Any Pertinent Medical History? see below for history
Neurological: NONE
EENT: allergies
Cardiovascular: hyperlipidemia
Respiratory: NONE
Gastrointestinal: GERD
Hepatic: NONE
Renal: NONE
Musculoskeletal: disk herniation, sciatica
Psychiatric: NONE
Endocrine: NONE
Blood Disorders: NONE
Cancer(s): NONE
GYN/Reproductive: NONE
History of MRSA: No
History of VRE: No
History of CDIFF: No
 
Surgical History
Surgical History: , hysterectomy, tubal ligation, "ACID REFLUX SX" LEFT
KNEE SURGERY Nissen fundiplication
 
Psychosocial History
Who do you live with Spouse
Services at Home None
What is your primary language English
Tobacco Use: Never used
 
Family History
Family History, If Any:
FATHER
MOTHER
BROTHER
SISTER
Relation not specified for:
  FH: asthma
  FH: colon cancer
  FH: Crohn's disease
  FH: early death
  FH: pancreatic cancer
  Hypertrophic cardiomyopathy
 
Hx Contributory? No
(Gold Guidry)
 
Review of Systems
 
Review of Systems
Constitutional:
Reports: fever, malaise. 
EENTM:
Reports: no symptoms. 
Respiratory:
Reports: see HPI, cough, sputum production. 
Cardiovascular:
Reports: no symptoms. 
GI:
Reports: no symptoms. 
Genitourinary:
Reports: no symptoms. 
Musculoskeletal:
Reports: joint pain, muscle pain, muscle stiffness. 
Skin:
Reports: no symptoms. 
Neurological/Psychological:
Reports: no symptoms. 
Hematologic/Endocrine:
Reports: no symptoms. 
Immunologic/Allergic:
Reports: no symptoms. 
All Other Systems: Reviewed and Negative
(Gold Guidry)
 
Physical Exam
 
Physical Exam
General Appearance: well developed/nourished, no apparent distress, alert, awake
Head: atraumatic, normal appearance
Eyes:
Bilateral: normal appearance, PERRL, EOMI. 
Ears, Nose, Throat: moist mucous membrane, Tympanic normal, pharynx normal, 
nasal congestion, nasal drainage
Neck: normal inspection, supple, full range of motion
Respiratory: normal breath sounds, chest non-tender, no respiratory distress, 
lungs clear
Cardiovascular: regular rate/rhythm, normal peripheral pulses
Peripheral Pulses:
2+ radial (R), 2+ radial (L)
Gastrointestinal: normal bowel sounds, soft, non-tender, no organomegaly
Back: normal inspection, normal range of motion, no vertebral tenderness
Extremities: normal inspection, normal range of motion, no edema
Neurologic/Psych: no motor/sensory deficits, awake, alert, oriented x 3, normal 
gait
Skin: intact, normal color, warm/dry
Lymphatic: no anterior cervical lorna
 
Core Measures
Sepsis Present: No
Sepsis Focused Exam Completed? No
(Gold Guidry)
 
Progress
Differential Diagnosis: influenza, pneumonia, pharyngitis, sinusitis, ACUTE 
BRONCHITIS, pe, ASTHMA EXACERBATION, copd
Plan of Care:
 Orders
 
 
Procedure Date/time Status
 
LACTIC ACID 2128 Complete
 
BLOOD CULTURE 1828 Active
 
TROPONIN LEVEL 1828 Complete
 
LACTIC ACID  182 Complete
 
COMPREHENSIVE METABOLIC PANEL  182 Complete
 
CBC WITHOUT DIFFERENTIAL  182 Complete
 
RAPID VIRAL INFLUENZA A  1502 Complete
 
EKG  1440 Active
 
 
 Laboratory Tests
 
 
 
18 2140:
Lactic Acid 1.6
 
18 1833:
Anion Gap 15, Estimated GFR > 60, BUN/Creatinine Ratio 20.0, Glucose 101  H, 
Lactic Acid 1.5, Calcium 9.8, Total Bilirubin 0.9, AST 29, ALT 43, Alkaline 
Phosphatase 81, Troponin I < 0.01, Total Protein 8.0, Albumin 4.7, Globulin 3.3,
Albumin/Globulin Ratio 1.4, CBC w Diff MAN DIFF ORDERED, RBC 4.31, MCV 90.1, MCH
30.7, MCHC 34.1, RDW 12.6, MPV 9.0, Gran % 85.7  H, Lymphocytes % 9.9  L, 
Monocytes % 3.9, Eosinophils % 0.2, Basophils % 0.3, Absolute Granulocytes 13.9 
H, Segmented Neutrophils 81  H, Band Neutrophils 5, Absolute Lymphocytes 1.6, 
Lymphocytes 12  L, Monocytes 2, Absolute Monocytes 0.6, Absolute Eosinophils 0, 
Absolute Basophils 0, Platelet Estimate VERIFIED BY SMEAR, Normocytic RBCs 
VERIFIED, Normochromic RBCs VERIFIED, Fld Total RBCs Counted 100
 Microbiology
1920  BLOOD: Blood Culture - RECD
 1850  BLOOD: Blood Culture - RECD
 1503  NASOPHARYN: Influenza Virus A & B Rapid Smear - COMP
 
Patient seen and evaluated.  She has a low-grade temp and tachycardia on initial
evaluation.  Patient was medicated with Toradol and 2 L of normal saline.  Will 
check basic blood work chest x-ray.
 
Chest x-ray shows evidence of a developing right-sided pneumonia.  She has an 
elevated white count of 16,000.  Negative lactic acid.  Heart rate has improved 
to 96.  Cultures were obtained patient was medicated with a dose of IV 
ceftriaxone and IV Zithromax.  She reports feeling better she is afebrile and 
oxygen saturation mid 90s on room air.  No history of underlying lung disease. 
PSI CLASS 2 outpatient treatment is appropriate in the situation given.  Patient
will be discharged home with a prescription for Levaquin.  Advised her to 
continue Tylenol and ibuprofen.  Follow up with primary care doctor on Monday 
for recheck return with any concerns.  Patient agrees the plan.
Diagnostic Imaging:
Viewed by Me: Radiology Read.  Discussed w/RAD: Radiology Read. 
Radiology Impression: PATIENT: EUGENIE SCOTT  MEDICAL RECORD NO: [id number] 
PRESENT AGE: 52  PATIENT ACCOUNT NO: [id number] : 66  LOCATION: [facility] 
ORDERING PHYSICIAN: Hay Husain DO (TBS)     SERVICE DATE:  EXAM 
TYPE: RAD - XRY-CHEST XRAY, TWO VIEWS EXAMINATION: XR CHEST CLINICAL INFORMATION
: Chest pain, productive cough COMPARISON: CT 2017 TECHNIQUE: 2 views of 
the chest were obtained. FINDINGS: Lung volumes are symmetric. There is 
suspected patchy right lower lobe airspace opacity. No evidence of pneumothorax,
pleural effusion, or pulmonary edema. The cardiomediastinal contour is 
unremarkable. No acute osseous findings are seen. IMPRESSION: Suspect patchy 
right lower lobe airspace opacity, concerning for developing pneumonia in the 
proper clinical setting. Radiographic followup after treatment/resolution of 
symptoms is recommended. DICTATED BY: Artemio Garsia MD  DATE/TIME DICTATED: :[email or web address]  DATE/TIME TRANSCRIBED:18
Initial ED EKG: SINUS TACHYCARDIA 124, LOW VOLTAGE IN FRONTAL LEADS 
(Gold Guidry)
 
Departure
 
Departure
Disposition: HOME OR SELF CARE
Condition: Stable
Clinical Impression
Primary Impression: Pneumonia
Qualifiers:  Pneumonia type: due to unspecified organism Laterality: right Lung 
location: lower lobe of lung Qualified Code: J18.1 - Lobar pneumonia, 
unspecified organism
Referrals:
Jeremy MORALES,Porter SANTOYO (PCP/Family)
 
Additional Instructions:
Rest and drink plenty of fluids.  Take antibiotics as directed for the full 
course.  Use ibuprofen 800 mg every 8 hours with food as needed for pain.  
Cheratussin as needed for cough this may cause drowinsess.  Make a follow-up 
appointment with her primary care doctor for this coming week.  Monitor symptoms
closely return with any concerns.
Departure Forms:
Customer Survey
General Discharge Information
Prescriptions:
Current Visit Scripts
Levofloxacin (Levaquin) 1 TAB PO DAILY  
     #10 TAB 
 
Codeine Phosphate/Guaifenesi (Cheratussin AC Syrup) 10 ML PO Q6H PRN COUGH 
     #240 ML 
 
Ibuprofen 1 TAB PO TID PRN pain/fever  
     #30 TAB 
 
Levofloxacin (Levaquin) 1 TAB PO DAILY  
     #10 TAB 
 
Codeine Phosphate/Guaifenesi (Cheratussin AC Syrup) 10 ML PO Q6H PRN PAIN  
     #240 ML 
 
Ibuprofen 1 TAB PO TID PRN PAIN/FEVER  
     #30 TAB 
 
 
(Gold Guidry)
 
PA/NP Co-Sign Statement
Statement:
ED Attending supervision documentation-
 
[] I saw and evaluated the patient. I have also reviewed all the pertinent lab 
results and diagnostic results. I agree with the findings and the plan of care 
as documented in the PA's/NP's documentation. 
 
[x] I have reviewed the ED Record and agree with the PA's/NP's documentation.
 
[] Additions or exceptions (if any) to the PAs/NP's note and plan are 
summarized below:
[]
 
(Parker MORALES,Pasquale WOODS)

## 2018-04-06 NOTE — RADIOLOGY REPORT
EXAMINATION:
XR CHEST
 
CLINICAL INFORMATION:
Chest pain, productive cough
 
COMPARISON:
CT 06/28/2017
 
TECHNIQUE:
2 views of the chest were obtained.
 
FINDINGS:
Lung volumes are symmetric. There is suspected patchy right lower lobe
airspace opacity. No evidence of pneumothorax, pleural effusion, or pulmonary
edema.
 
The cardiomediastinal contour is unremarkable. No acute osseous findings are
seen.
 
IMPRESSION:
Suspect patchy right lower lobe airspace opacity, concerning for developing
pneumonia in the proper clinical setting. Radiographic followup after
treatment/resolution of symptoms is recommended.

## 2024-08-12 NOTE — ED GI/GU/ABDOMINAL COMPLAINT
History of Present Illness
 
General
Chief Complaint: Abdominal Pain/Flank Pain
Stated Complaint: ABD PAIN, HEMATURIA
Source: patient, old records
Exam Limitations: no limitations
 
Vital Signs & Intake/Output
Vital Signs & Intake/Output
 Vital Signs
 
 
Date Time Temp Pulse Resp B/P Pulse O2 O2 Flow FiO2
 
     Ox Delivery Rate 
 
 1720  76 18 139/84 98 Room Air  
 
 1518 98.2 96 18 118/82 98 Room Air  
 
 
Allergies
Coded Allergies:
Influenza Virus Vaccines (ITCHING 16)
morphine (REDNESS, ITCHING, SWELLING 16)
tetanus immune globulin (UNKNOWN 16)
tetanus toxoid, adsorbed (RASH 16)
prochlorperazine (From Compazine) (STROKE-LIKE SYMPTOMS 16)
 
Reconcile Medications
Cholecalciferol (Vitamin D3) (Vitamin D3) 2,000 UNIT TABLET   1 TAB PO DAILY 
SUPPLEMENT  (Reported)
Dicyclomine Hydrochloride (Bentyl) 10 MG CAPSULE   2 CAP PO TID pain
Meclizine HCl 12.5 MG TABLET   1 TAB PO BID VERTIGO  (Reported)
Multivitamin (Multi-Day Vitamins) 1 EACH TABLET   1 TAB PO DAILY SUPPLEMENT  (
Reported)
Naproxen (Naprosyn) 500 MG TABLET   1 TAB PO BID PAIN  (Reported)
Oxycodone HCl/Acetaminophen (Percocet  MG Tablet) 10 MG-325 MG TABLET   1 
TAB PO Q6H PAIN  (Reported)
Pantoprazole Sodium 40 MG TABLET.DR   1 TAB PO DAILY GI  (Reported)
Simvastatin (Zocor*) 20 MG TABLET   1 TAB PO QPM CHOLESTEROL  (Reported)
 
Triage Note:
RECEIVED 52 YO FEMALE C/O DEVELOPED PERIUMBILLICAL
AREA PAIN AT 12 NOON TODAY. PT THEN HAD ONE
EPISODE OF HEMATURIA. NO SUBSEQUENT EPISODES. PT
REPORTS PAIN IN ABDOMIN INTERMITTENT, SHARP. PT
JUST GOT OVER DIARRHEA.
Triage Nurses Notes Reviewed? yes
Pregnant? n
Is pt currently breastfeeding? No
Onset: Abrupt
Duration: hour(s): (3), better, constant
Timing: recent history
Quality/Severity: aching, cramping, mild, moderate
Severity Numbers: 5
Location: epigastric
Radiation: no radiation
Activities at Onset: none
Prior Abdominal Problems: none
No Modifying Factors: none
Associated Symptoms: denies
HPI:
51 year old female with PMHx of GERD s/p Nissen fundoplication, HLD and fibroids
s/p hysterectomy presents emergency room for evaluation after she developed 
sudden onset of mid upper quadrant abdominal pain associated with 1 episode of 
hematuria since 12:00 today.  The patient states she has urinated since and it 
was normal.  She denies any urgency frequency dysuria.  No vaginal bleeding or 
discharge.  She denies any black or bloody stools no nausea or vomiting.  She 
states earlier this week she had a stomach bug and was having episodes of 
diarrhea however has since resolved.  She reports that this pain feels like her 
reflux which she has not had it many years status post her Nissen 
fundoplication.  She is not taken anything for symptoms
 
 
 
Past History
 
Travel History
Traveled to [place] past 21 day No
 
Medical History
Any Pertinent Medical History? see below for history
Neurological: NONE
EENT: allergies
Cardiovascular: hyperlipidemia
Respiratory: NONE
Gastrointestinal: GERD
Hepatic: NONE
Renal: NONE
Musculoskeletal: disk herniation, sciatica
Psychiatric: NONE
Endocrine: NONE
Blood Disorders: NONE
Cancer(s): NONE
GYN/Reproductive: NONE
History of MRSA: No
History of VRE: No
History of CDIFF: No
 
Surgical History
Surgical History: , hysterectomy, tubal ligation, "ACID REFLUX SX" LEFT
KNEE SURGERY
 
Psychosocial History
Who do you live with Spouse
Services at Home None
What is your primary language English
Tobacco Use: Never used
 
Family History
Family History, If Any:
FATHER
MOTHER
BROTHER
SISTER
Relation not specified for:
  FH: asthma
  FH: colon cancer
  FH: Crohn's disease
  FH: early death
  FH: pancreatic cancer
  Hypertrophic cardiomyopathy
 
Hx Contributory? No
 
Review of Systems
 
Review of Systems
Constitutional:
Reports: see HPI. 
All Other Systems: Reviewed and Negative
Comments
Review of systems: See HPI, All other systems negative.
Constitutional, no chills no fever, no malaise
HEENT:  no sore throat no congestion, no ear pain
Cardiovascular: No chest pain , no palpitation ,
Skin, no jaundice no rashes, no change in skin
Respiratory: No dyspnea no cough no  sputum 
GI: No nausea no vomiting, no diarrhea, no bloating/constipation
: No dysuria  hematuria, no frequency, no discharge
Muscle skeletal: No joint pain, no joint swelling, no back pain, no neck pain,
Neurologic: No numbness no headache
Psych: No stress 
Heme/endocrine: No bruising no bleeding 
Immunology: No lymphadenopathy
 
Physical Exam
 
Physical Exam
General Appearance: well developed/nourished, no apparent distress, alert, awake
Gastrointestinal: soft
Comments:
Well-developed well-nourished person in no acute distress
HEENT: Normal EENT exam; PERRL, EOMI. HEAD is atraumatic. moist mucous 
membranes.  
Neck: Supple,  normal range of motion without pain or tenderness
Back: Nontender, no CVA tenderness. Full range of motion
Cardiovascular: Regular rate and rhythms no murmurs rubs
Respiratory: Chest nontender.There were no bony deformities, no asymmetry. No 
respiratory distress.  Patient speaking in full complete sentences. Breath 
sounds clear to auscultation bilaterally: NO W/R/R
Abdomen: Soft, nontender nondistended, no appreciable organomegaly. Normal bowel
sounds. No rebound/guarding, No appreciable enlargement of the abdominal aorta, 
No ascites.
Extremity: No edema, full range of motion of extremities
Neuro: Alert oriented x3, motor sensory normal. There were no obvious focal 
neurologic abnormalities.
Skin: No appreciable rash on exposed skin, skin is warm and dry.  No jaundice no
diaphoresis
Psych: Mood and affect is normal, memory and judgment is normal.
 
Core Measures
ACS in differential dx? No
Severe Sepsis Present: No
Septic Shock Present: No
 
Progress
Differential Diagnosis: appendicitis, biliary colic, bowel obstruction, colon 
cancer, diverticulitis, gastritis, hepatitis, hernia, inflamm bowel dis, kidney 
stone, pancreatitis, UTI/pyelo
Plan of Care:
 Orders
 
 
Procedure Date/time Status
 
LIPASE  1532 Complete
 
COMPREHENSIVE METABOLIC PANEL  1532 Complete
 
CBC WITHOUT DIFFERENTIAL  1532 Complete
 
AMYLASE  1532 Complete
 
URINALYSIS  1511 Complete
 
 
 Laboratory Tests
 
 
 
/ 1541:
Anion Gap 11, Estimated GFR > 60, BUN/Creatinine Ratio 23.3, Glucose 84, Calcium
9.4, Total Bilirubin 0.6, AST 23, ALT 54  H, Alkaline Phosphatase 83, Total 
Protein 7.0, Albumin 4.1, Globulin 2.9, Albumin/Globulin Ratio 1.4, Amylase 46, 
Lipase 183, CBC w Diff NO MAN DIFF REQ, RBC 4.31, MCV 90.6, MCH 30.4, RDW 13.5, 
MPV 8.5, Gran % 63.6, Lymphocytes % 27.9, Monocytes % 6.2, Eosinophils % 1.9, 
Basophils % 0.4, Absolute Granulocytes 5.9, Absolute Lymphocytes 2.6, Absolute 
Monocytes 0.6, Absolute Eosinophils 0.2, Absolute Basophils 0, PUBS MCHC 33.5, 
Urine Color YEL, Urine Clarity CLEAR, Urine pH 6.0, Ur Specific Gravity 1.025, 
Urine Protein NEG, Urine Ketones NEG, Urine Nitrite NEG, Urine Bilirubin NEG, 
Urine Urobilinogen 0.2, Ur Leukocyte Esterase NEG, Ur Microscopic SEDIMENT 
EXAMINED, Urine RBC 1-3, Urine WBC 1-3  H, Ur Epithelial Cells FEW, Urine 
Bacteria RARE  H, Urine Hemoglobin TRACE-INTACT, Urine Glucose NEG
Labs ordered old records reviewed patient acute GI cocktail will continue to 
monitor
 
 
2017 5:21:02 PM patient feeling improved after being medicated Percocet I 
discussed thoroughly fall from lab results, and urine results.  I do not believe
the patient requires a CAT scan at this time there is no abdominal wall 
tenderness at this time negative Shipley's sign and there is no right lower 
quadrant tenderness.  I discussed with her there is the possibility given the 
symptoms began at 12:00 today that it is too early to be seen and blood work 
however I do not which she requires any imaging which she is in agreement with. 
The patient was provided prescription for Bentyl advise close follow-up with her
primary care on Monday however return precautions were provided to her
 
I discussed with the patient at length all of their results.  I had an extensive
conversation regarding need for close follow up with their primary care 
physician this week as well as return precautions.  I answered all of their 
questions, they feel comfortable with the plan and follow-up care. 
 
I discussed the medications that they will receive with the patient. I gave them
signs and symptoms that could indicate an adverse reaction. I have advised them 
to limit their activities until they can see how they respond to the medication.
 
 
(MARLON ALICEA)
Initial ED EKG: none
 
Departure
 
Departure
Time of Disposition: 
Disposition: HOME OR SELF CARE
Condition: Stable
Clinical Impression
Primary Impression: Abdominal pain
Referrals:
ELVIRA MORALES,GLENN AYALA (PCP/Family)
 
Additional Instructions:
bentyl for pain. follow up with your pmd on monday, return immediately if your 
symptoms worsen despite medication, you develop nausea, vomiting, diarrhea, 
fever, chills or any other concerns. this was sent to the [facility]
Departure Forms:
Customer Survey
General Discharge Information
Prescriptions:
Current Visit Scripts
Dicyclomine Hydrochloride (Bentyl) 2 CAP PO TID 
     #20 CAP Detail Level: Detailed General Sunscreen Counseling: I recommended a broad spectrum sunscreen with a SPF of 30 or higher.  I explained that SPF 30 sunscreens block approximately 97 percent of the sun's harmful rays.  Sunscreens should be applied at least 15 minutes prior to expected sun exposure and then every 2 hours after that as long as sun exposure continues. If swimming or exercising sunscreen should be reapplied every 45 minutes to an hour after getting wet or sweating.  One ounce, or the equivalent of a shot glass full of sunscreen, is adequate to protect the skin not covered by a bathing suit. I also recommended a lip balm with a sunscreen as well. Sun protective clothing can be used in lieu of sunscreen but must be worn the entire time you are exposed to the sun's rays. Products Recommended: Eucerin Sensitive skin Mineral with 24% Zinc oxide\\nElta MD\\nZinc based sunscreens